# Patient Record
Sex: FEMALE | Race: WHITE | NOT HISPANIC OR LATINO | Employment: OTHER | RURAL
[De-identification: names, ages, dates, MRNs, and addresses within clinical notes are randomized per-mention and may not be internally consistent; named-entity substitution may affect disease eponyms.]

---

## 2022-01-31 ENCOUNTER — HOSPITAL ENCOUNTER (OUTPATIENT)
Dept: RADIOLOGY | Facility: HOSPITAL | Age: 62
Discharge: HOME | End: 2022-01-31
Attending: NURSE PRACTITIONER
Payer: COMMERCIAL

## 2022-01-31 DIAGNOSIS — M54.50 LOW BACK PAIN: ICD-10-CM

## 2022-01-31 DIAGNOSIS — M54.50 LOW BACK PAIN: Primary | ICD-10-CM

## 2022-01-31 PROCEDURE — 72100 X-RAY EXAM L-S SPINE 2/3 VWS: CPT | Mod: TC

## 2022-02-21 ENCOUNTER — HOSPITAL ENCOUNTER (EMERGENCY)
Facility: HOSPITAL | Age: 62
Discharge: HOME | End: 2022-02-22
Attending: FAMILY MEDICINE
Payer: COMMERCIAL

## 2022-02-21 DIAGNOSIS — R56.9 SEIZURE: ICD-10-CM

## 2022-02-21 DIAGNOSIS — K52.9 COLITIS: Primary | ICD-10-CM

## 2022-02-21 LAB
ALBUMIN SERPL BCP-MCNC: 4.3 G/DL (ref 3.5–5)
ALBUMIN/GLOB SERPL: 1.2 {RATIO}
ALP SERPL-CCNC: 118 U/L (ref 46–118)
ALT SERPL W P-5'-P-CCNC: 32 U/L (ref 13–56)
ANION GAP SERPL CALCULATED.3IONS-SCNC: 19 MMOL/L (ref 7–16)
AST SERPL W P-5'-P-CCNC: 17 U/L (ref 15–37)
BACTERIA #/AREA URNS HPF: ABNORMAL /HPF
BASOPHILS # BLD AUTO: 0.04 K/UL (ref 0–0.2)
BASOPHILS NFR BLD AUTO: 0.2 % (ref 0–1)
BILIRUB SERPL-MCNC: 0.7 MG/DL (ref 0–1.2)
BILIRUB UR QL STRIP: NEGATIVE
BUN SERPL-MCNC: 17 MG/DL (ref 7–18)
BUN/CREAT SERPL: 25 (ref 6–20)
CALCIUM SERPL-MCNC: 9.9 MG/DL (ref 8.5–10.1)
CHLORIDE SERPL-SCNC: 99 MMOL/L (ref 98–107)
CLARITY UR: CLEAR
CO2 SERPL-SCNC: 27 MMOL/L (ref 21–32)
COLOR UR: ABNORMAL
CREAT SERPL-MCNC: 0.67 MG/DL (ref 0.55–1.02)
DIFFERENTIAL METHOD BLD: ABNORMAL
EOSINOPHIL # BLD AUTO: 0 K/UL (ref 0–0.5)
EOSINOPHIL NFR BLD AUTO: 0 % (ref 1–4)
ERYTHROCYTE [DISTWIDTH] IN BLOOD BY AUTOMATED COUNT: 11.7 % (ref 11.5–14.5)
GLOBULIN SER-MCNC: 3.7 G/DL (ref 2–4)
GLUCOSE SERPL-MCNC: 167 MG/DL (ref 74–106)
GLUCOSE SERPL-MCNC: 180 MG/DL (ref 70–105)
GLUCOSE UR STRIP-MCNC: NEGATIVE MG/DL
HCT VFR BLD AUTO: 46.2 % (ref 38–47)
HGB BLD-MCNC: 16.3 G/DL (ref 12–16)
IMM GRANULOCYTES # BLD AUTO: 0.14 K/UL (ref 0–0.04)
IMM GRANULOCYTES NFR BLD: 0.6 % (ref 0–0.4)
KETONES UR STRIP-SCNC: NEGATIVE MG/DL
LEUKOCYTE ESTERASE UR QL STRIP: ABNORMAL
LIPASE SERPL-CCNC: 60 U/L (ref 73–393)
LYMPHOCYTES # BLD AUTO: 1.35 K/UL (ref 1–4.8)
LYMPHOCYTES NFR BLD AUTO: 5.8 % (ref 27–41)
LYMPHOCYTES NFR BLD MANUAL: 6 % (ref 27–41)
MCH RBC QN AUTO: 30.3 PG (ref 27–31)
MCHC RBC AUTO-ENTMCNC: 35.3 G/DL (ref 32–36)
MCV RBC AUTO: 85.9 FL (ref 80–96)
MONOCYTES # BLD AUTO: 0.29 K/UL (ref 0–0.8)
MONOCYTES NFR BLD AUTO: 1.3 % (ref 2–6)
MONOCYTES NFR BLD MANUAL: 2 % (ref 2–6)
MPC BLD CALC-MCNC: 12.9 FL (ref 9.4–12.4)
NEUTROPHILS # BLD AUTO: 21.38 K/UL (ref 1.8–7.7)
NEUTROPHILS NFR BLD AUTO: 92.1 % (ref 53–65)
NEUTS BAND NFR BLD MANUAL: 4 % (ref 1–5)
NEUTS SEG NFR BLD MANUAL: 88 % (ref 50–62)
NITRITE UR QL STRIP: NEGATIVE
NRBC # BLD AUTO: 0 X10E3/UL
NRBC, AUTO (.00): 0 %
PH UR STRIP: 8 PH UNITS
PLATELET # BLD AUTO: 239 K/UL (ref 150–400)
PLATELET MORPHOLOGY: ABNORMAL
POTASSIUM SERPL-SCNC: 3.1 MMOL/L (ref 3.5–5.1)
PROT SERPL-MCNC: 8 G/DL (ref 6.4–8.2)
PROT UR QL STRIP: 30
RBC # BLD AUTO: 5.38 M/UL (ref 4.2–5.4)
RBC # UR STRIP: ABNORMAL /UL
RBC #/AREA URNS HPF: ABNORMAL /HPF
RBC MORPH BLD: NORMAL
SODIUM SERPL-SCNC: 142 MMOL/L (ref 136–145)
SP GR UR STRIP: 1.01
SQUAMOUS #/AREA URNS LPF: ABNORMAL /LPF
UROBILINOGEN UR STRIP-ACNC: 0.2 MG/DL
WBC # BLD AUTO: 23.2 K/UL (ref 4.5–11)
WBC #/AREA URNS HPF: ABNORMAL /HPF

## 2022-02-21 PROCEDURE — 82962 GLUCOSE BLOOD TEST: CPT

## 2022-02-21 PROCEDURE — 81001 URINALYSIS AUTO W/SCOPE: CPT | Performed by: FAMILY MEDICINE

## 2022-02-21 PROCEDURE — 96367 TX/PROPH/DG ADDL SEQ IV INF: CPT

## 2022-02-21 PROCEDURE — 99284 PR EMERGENCY DEPT VISIT,LEVEL IV: ICD-10-PCS | Mod: ,,, | Performed by: FAMILY MEDICINE

## 2022-02-21 PROCEDURE — 25500020 PHARM REV CODE 255: Performed by: FAMILY MEDICINE

## 2022-02-21 PROCEDURE — 99284 EMERGENCY DEPT VISIT MOD MDM: CPT | Mod: ,,, | Performed by: FAMILY MEDICINE

## 2022-02-21 PROCEDURE — 63600175 PHARM REV CODE 636 W HCPCS: Performed by: FAMILY MEDICINE

## 2022-02-21 PROCEDURE — 96375 TX/PRO/DX INJ NEW DRUG ADDON: CPT

## 2022-02-21 PROCEDURE — 63600175 PHARM REV CODE 636 W HCPCS

## 2022-02-21 PROCEDURE — 36415 COLL VENOUS BLD VENIPUNCTURE: CPT | Performed by: FAMILY MEDICINE

## 2022-02-21 PROCEDURE — 96365 THER/PROPH/DIAG IV INF INIT: CPT

## 2022-02-21 PROCEDURE — 85025 COMPLETE CBC W/AUTO DIFF WBC: CPT | Performed by: FAMILY MEDICINE

## 2022-02-21 PROCEDURE — 83690 ASSAY OF LIPASE: CPT | Performed by: FAMILY MEDICINE

## 2022-02-21 PROCEDURE — 99285 EMERGENCY DEPT VISIT HI MDM: CPT | Mod: 25

## 2022-02-21 PROCEDURE — 96366 THER/PROPH/DIAG IV INF ADDON: CPT

## 2022-02-21 PROCEDURE — 80053 COMPREHEN METABOLIC PANEL: CPT | Performed by: FAMILY MEDICINE

## 2022-02-21 PROCEDURE — 25000003 PHARM REV CODE 250: Performed by: FAMILY MEDICINE

## 2022-02-21 RX ORDER — GABAPENTIN 400 MG/1
CAPSULE ORAL
COMMUNITY

## 2022-02-21 RX ORDER — TRAMADOL HYDROCHLORIDE 50 MG/1
50 TABLET ORAL 3 TIMES DAILY PRN
COMMUNITY
Start: 2022-01-31 | End: 2022-10-12

## 2022-02-21 RX ORDER — BUDESONIDE, GLYCOPYRROLATE, AND FORMOTEROL FUMARATE 160; 9; 4.8 UG/1; UG/1; UG/1
2 AEROSOL, METERED RESPIRATORY (INHALATION) 2 TIMES DAILY
COMMUNITY
Start: 2022-01-26

## 2022-02-21 RX ORDER — KETOROLAC TROMETHAMINE 10 MG/1
10 TABLET, FILM COATED ORAL 2 TIMES DAILY PRN
COMMUNITY
Start: 2022-01-31 | End: 2022-10-12

## 2022-02-21 RX ORDER — ESCITALOPRAM OXALATE 10 MG/1
TABLET ORAL
COMMUNITY
Start: 2022-02-18 | End: 2022-10-12

## 2022-02-21 RX ORDER — VALSARTAN AND HYDROCHLOROTHIAZIDE 320; 25 MG/1; MG/1
1 TABLET, FILM COATED ORAL DAILY
COMMUNITY
Start: 2022-01-26

## 2022-02-21 RX ORDER — ALPRAZOLAM 0.5 MG/1
0.5 TABLET ORAL 3 TIMES DAILY
COMMUNITY
Start: 2022-01-31

## 2022-02-21 RX ORDER — CLONIDINE HYDROCHLORIDE 0.1 MG/1
0.1 TABLET ORAL 3 TIMES DAILY PRN
COMMUNITY
Start: 2021-12-27

## 2022-02-21 RX ORDER — METHOCARBAMOL 500 MG/1
500 TABLET, FILM COATED ORAL 2 TIMES DAILY PRN
COMMUNITY
Start: 2022-01-31 | End: 2022-05-28 | Stop reason: SDUPTHER

## 2022-02-21 RX ORDER — BACLOFEN 10 MG/1
10 TABLET ORAL 2 TIMES DAILY PRN
COMMUNITY
Start: 2022-01-04 | End: 2022-10-12

## 2022-02-21 RX ORDER — ONDANSETRON 2 MG/ML
4 INJECTION INTRAMUSCULAR; INTRAVENOUS
Status: COMPLETED | OUTPATIENT
Start: 2022-02-21 | End: 2022-02-21

## 2022-02-21 RX ORDER — LORAZEPAM 2 MG/ML
INJECTION INTRAMUSCULAR
Status: COMPLETED
Start: 2022-02-21 | End: 2022-02-21

## 2022-02-21 RX ORDER — TIZANIDINE 4 MG/1
TABLET ORAL
COMMUNITY
Start: 2022-02-18

## 2022-02-21 RX ORDER — LORAZEPAM 2 MG/ML
2 INJECTION INTRAMUSCULAR
Status: COMPLETED | OUTPATIENT
Start: 2022-02-21 | End: 2022-02-21

## 2022-02-21 RX ORDER — CLONIDINE HYDROCHLORIDE 0.1 MG/1
0.2 TABLET ORAL
Status: COMPLETED | OUTPATIENT
Start: 2022-02-21 | End: 2022-02-21

## 2022-02-21 RX ORDER — MELOXICAM 15 MG/1
TABLET ORAL
COMMUNITY
Start: 2022-01-26

## 2022-02-21 RX ORDER — AMLODIPINE BESYLATE 10 MG/1
10 TABLET ORAL DAILY
COMMUNITY
Start: 2022-01-26 | End: 2022-10-12

## 2022-02-21 RX ADMIN — LORAZEPAM 2 MG: 2 INJECTION INTRAMUSCULAR at 11:02

## 2022-02-21 RX ADMIN — CLONIDINE HYDROCHLORIDE 0.2 MG: 0.1 TABLET ORAL at 10:02

## 2022-02-21 RX ADMIN — PROMETHAZINE HYDROCHLORIDE 25 MG: 25 INJECTION INTRAMUSCULAR; INTRAVENOUS at 09:02

## 2022-02-21 RX ADMIN — LORAZEPAM 2 MG: 2 INJECTION INTRAMUSCULAR; INTRAVENOUS at 11:02

## 2022-02-21 RX ADMIN — IOPAMIDOL 100 ML: 755 INJECTION, SOLUTION INTRAVENOUS at 11:02

## 2022-02-21 RX ADMIN — ONDANSETRON 4 MG: 2 INJECTION INTRAMUSCULAR; INTRAVENOUS at 09:02

## 2022-02-21 RX ADMIN — SODIUM CHLORIDE 1000 ML: 9 INJECTION, SOLUTION INTRAVENOUS at 09:02

## 2022-02-22 VITALS
RESPIRATION RATE: 15 BRPM | HEIGHT: 66 IN | DIASTOLIC BLOOD PRESSURE: 86 MMHG | WEIGHT: 185 LBS | HEART RATE: 90 BPM | BODY MASS INDEX: 29.73 KG/M2 | TEMPERATURE: 99 F | OXYGEN SATURATION: 98 % | SYSTOLIC BLOOD PRESSURE: 166 MMHG

## 2022-02-22 LAB
BASOPHILS # BLD AUTO: 0.03 K/UL (ref 0–0.2)
BASOPHILS NFR BLD AUTO: 0.1 % (ref 0–1)
DIFFERENTIAL METHOD BLD: ABNORMAL
EOSINOPHIL # BLD AUTO: 0 K/UL (ref 0–0.5)
EOSINOPHIL NFR BLD AUTO: 0 % (ref 1–4)
ERYTHROCYTE [DISTWIDTH] IN BLOOD BY AUTOMATED COUNT: 11.5 % (ref 11.5–14.5)
HCT VFR BLD AUTO: 43.6 % (ref 38–47)
HGB BLD-MCNC: 15.4 G/DL (ref 12–16)
IMM GRANULOCYTES # BLD AUTO: 0.1 K/UL (ref 0–0.04)
IMM GRANULOCYTES NFR BLD: 0.5 % (ref 0–0.4)
LYMPHOCYTES # BLD AUTO: 1.67 K/UL (ref 1–4.8)
LYMPHOCYTES NFR BLD AUTO: 7.5 % (ref 27–41)
MCH RBC QN AUTO: 30.2 PG (ref 27–31)
MCHC RBC AUTO-ENTMCNC: 35.3 G/DL (ref 32–36)
MCV RBC AUTO: 85.5 FL (ref 80–96)
MONOCYTES # BLD AUTO: 1.04 K/UL (ref 0–0.8)
MONOCYTES NFR BLD AUTO: 4.7 % (ref 2–6)
MPC BLD CALC-MCNC: 11.5 FL (ref 9.4–12.4)
NEUTROPHILS # BLD AUTO: 19.33 K/UL (ref 1.8–7.7)
NEUTROPHILS NFR BLD AUTO: 87.2 % (ref 53–65)
NRBC # BLD AUTO: 0 X10E3/UL
NRBC, AUTO (.00): 0 %
PLATELET # BLD AUTO: 217 K/UL (ref 150–400)
RBC # BLD AUTO: 5.1 M/UL (ref 4.2–5.4)
WBC # BLD AUTO: 22.17 K/UL (ref 4.5–11)

## 2022-02-22 PROCEDURE — 25000003 PHARM REV CODE 250: Performed by: FAMILY MEDICINE

## 2022-02-22 PROCEDURE — 36415 COLL VENOUS BLD VENIPUNCTURE: CPT | Performed by: FAMILY MEDICINE

## 2022-02-22 PROCEDURE — S0030 INJECTION, METRONIDAZOLE: HCPCS | Performed by: FAMILY MEDICINE

## 2022-02-22 PROCEDURE — 63600175 PHARM REV CODE 636 W HCPCS

## 2022-02-22 PROCEDURE — 85025 COMPLETE CBC W/AUTO DIFF WBC: CPT | Performed by: FAMILY MEDICINE

## 2022-02-22 PROCEDURE — 63600175 PHARM REV CODE 636 W HCPCS: Performed by: FAMILY MEDICINE

## 2022-02-22 RX ORDER — ONDANSETRON 4 MG/1
8 TABLET, ORALLY DISINTEGRATING ORAL EVERY 6 HOURS PRN
Qty: 20 TABLET | Refills: 0 | Status: SHIPPED | OUTPATIENT
Start: 2022-02-22 | End: 2022-10-12

## 2022-02-22 RX ORDER — CIPROFLOXACIN 2 MG/ML
INJECTION, SOLUTION INTRAVENOUS
Status: COMPLETED
Start: 2022-02-22 | End: 2022-02-22

## 2022-02-22 RX ORDER — AMOXICILLIN AND CLAVULANATE POTASSIUM 875; 125 MG/1; MG/1
1 TABLET, FILM COATED ORAL 2 TIMES DAILY
Qty: 14 TABLET | Refills: 0 | Status: SHIPPED | OUTPATIENT
Start: 2022-02-22 | End: 2022-10-12

## 2022-02-22 RX ORDER — METRONIDAZOLE 500 MG/100ML
500 INJECTION, SOLUTION INTRAVENOUS
Status: DISCONTINUED | OUTPATIENT
Start: 2022-02-22 | End: 2022-02-22 | Stop reason: HOSPADM

## 2022-02-22 RX ORDER — CIPROFLOXACIN 2 MG/ML
400 INJECTION, SOLUTION INTRAVENOUS
Status: COMPLETED | OUTPATIENT
Start: 2022-02-22 | End: 2022-02-22

## 2022-02-22 RX ORDER — HYDRALAZINE HYDROCHLORIDE 20 MG/ML
20 INJECTION INTRAMUSCULAR; INTRAVENOUS
Status: COMPLETED | OUTPATIENT
Start: 2022-02-22 | End: 2022-02-22

## 2022-02-22 RX ADMIN — SODIUM CHLORIDE 1000 ML: 9 INJECTION, SOLUTION INTRAVENOUS at 03:02

## 2022-02-22 RX ADMIN — METRONIDAZOLE 500 MG: 500 INJECTION, SOLUTION INTRAVENOUS at 03:02

## 2022-02-22 RX ADMIN — CIPROFLOXACIN 400 MG: 2 INJECTION, SOLUTION INTRAVENOUS at 02:02

## 2022-02-22 RX ADMIN — HYDRALAZINE HYDROCHLORIDE 20 MG: 20 INJECTION INTRAMUSCULAR; INTRAVENOUS at 04:02

## 2022-02-22 NOTE — ED PROVIDER NOTES
Encounter Date: 2/21/2022       History     Chief Complaint   Patient presents with    Abdominal Pain    Back Pain     Patient is a 61-year-old  female who presents to the ED via EMS with complaints of vomiting abdominal pain.  Patient states she ate a peanut butter and jelly sandwich about 12:00 p.m. today, and seen after he began vomiting.  Since then, she has not been able to keep anything down, with the exception of a few sips of water.  She states the pain began after vomiting, and is primarily located in her lower abdomen.  Patient has a history of chronic low back pain secondary to surgery, but states her pain has been worse than usual since yesterday.  She reports chills overnight and a severe headache all day.  Patient denies fever, cough, chest pain, diarrhea constipation, urinary symptoms.        Review of patient's allergies indicates:   Allergen Reactions    Varenicline Other (See Comments)     History reviewed. No pertinent past medical history.  History reviewed. No pertinent surgical history.  History reviewed. No pertinent family history.  Social History     Tobacco Use    Smoking status: Never Smoker    Smokeless tobacco: Never Used   Substance Use Topics    Alcohol use: Not Currently    Drug use: Not Currently     Review of Systems   Constitutional: Positive for chills. Negative for fever.   Gastrointestinal: Positive for abdominal pain, nausea and vomiting. Negative for blood in stool, constipation and diarrhea.   Musculoskeletal: Positive for back pain.   Neurological: Positive for weakness and headaches. Negative for dizziness, syncope and light-headedness.   All other systems reviewed and are negative.      Physical Exam     Initial Vitals [02/21/22 2110]   BP Pulse Resp Temp SpO2   (!) 219/108 108 18 99 °F (37.2 °C) 99 %      MAP       --         Physical Exam    Constitutional: She appears well-developed and well-nourished.   Patient is writhing in pain in the bed on exam.    HENT:   Head: Normocephalic and atraumatic.   Eyes: Conjunctivae are normal. Pupils are equal, round, and reactive to light.   Cardiovascular: Normal rate, regular rhythm and normal heart sounds.   Pulmonary/Chest: Breath sounds normal. No respiratory distress.   Abdominal: Abdomen is soft. Bowel sounds are normal. She exhibits no distension. There is abdominal tenderness (epigastric). There is negative Wright's sign.     Neurological: She is alert and oriented to person, place, and time.   Skin: Skin is warm and dry. No pallor.         Medical Screening Exam   See Full Note    ED Course   Procedures  Labs Reviewed   COMPREHENSIVE METABOLIC PANEL - Abnormal; Notable for the following components:       Result Value    Potassium 3.1 (*)     Anion Gap 19 (*)     Glucose 167 (*)     BUN/Creatinine Ratio 25 (*)     All other components within normal limits   URINALYSIS, REFLEX TO URINE CULTURE - Abnormal; Notable for the following components:    Leukocytes, UA Small (*)     Protein, UA 30  (*)     Blood, UA Moderate (*)     All other components within normal limits   LIPASE - Abnormal; Notable for the following components:    Lipase 60 (*)     All other components within normal limits   CBC WITH DIFFERENTIAL - Abnormal; Notable for the following components:    WBC 23.20 (*)     Hemoglobin 16.3 (*)     MPV 12.9 (*)     Neutrophils % 92.1 (*)     Lymphocytes % 5.8 (*)     Monocytes % 1.3 (*)     Eosinophils % 0.0 (*)     Immature Granulocytes % 0.6 (*)     Neutrophils, Abs 21.38 (*)     Immature Granulocytes, Absolute 0.14 (*)     All other components within normal limits   MANUAL DIFFERENTIAL - Abnormal; Notable for the following components:    Segmented Neutrophils, Man % 88 (*)     Lymphocytes, Man % 6 (*)     Platelet Morphology Few Large Platelets (*)     All other components within normal limits   URINALYSIS, MICROSCOPIC - Abnormal; Notable for the following components:    Bacteria, UA Occasional (*)     All other  components within normal limits   CBC WITH DIFFERENTIAL - Abnormal; Notable for the following components:    WBC 22.17 (*)     Neutrophils % 87.2 (*)     Lymphocytes % 7.5 (*)     Eosinophils % 0.0 (*)     Immature Granulocytes % 0.5 (*)     Neutrophils, Abs 19.33 (*)     Monocytes, Absolute 1.04 (*)     Immature Granulocytes, Absolute 0.10 (*)     All other components within normal limits   POCT GLUCOSE MONITORING CONTINUOUS - Abnormal; Notable for the following components:    POC Glucose 180 (*)     All other components within normal limits   CBC W/ AUTO DIFFERENTIAL    Narrative:     The following orders were created for panel order CBC auto differential.  Procedure                               Abnormality         Status                     ---------                               -----------         ------                     CBC with Differential[[id number]]        Abnormal            Final result               Manual Differential[[id number]]          Abnormal            Final result                 Please view results for these tests on the individual orders.   CBC W/ AUTO DIFFERENTIAL    Narrative:     The following orders were created for panel order CBC auto differential.  Procedure                               Abnormality         Status                     ---------                               -----------         ------                     CBC with Differential[[id number]]        Abnormal            Final result                 Please view results for these tests on the individual orders.   EXTRA TUBES    Narrative:     The following orders were created for panel order EXTRA TUBES.  Procedure                               Abnormality         Status                     ---------                               -----------         ------                     Light Blue Top Hold[[id number]]                              In process                 Lavender Top Hold[[id number]]                                In  process                 Gold Top Hold[[id number]]                                    In process                   Please view results for these tests on the individual orders.   LIGHT BLUE TOP HOLD   LAVENDER TOP HOLD   GOLD TOP HOLD          Imaging Results          CT Head Without Contrast (In process)                CT Abdomen Pelvis With Contrast (In process)                  Medications   metronidazole IVPB 500 mg (0 mg Intravenous Stopped 2/22/22 0454)   sodium chloride 0.9% bolus 1,000 mL (0 mLs Intravenous Stopped 2/21/22 2301)   ondansetron injection 4 mg (4 mg Intravenous Given 2/21/22 2128)   cloNIDine tablet 0.2 mg (0.2 mg Oral Given 2/21/22 2211)   promethazine (PHENERGAN) 25 mg in dextrose 5 % 50 mL IVPB (0 mg Intravenous Stopped 2/21/22 2301)   LORazepam injection 2 mg (2 mg Intravenous Given 2/21/22 2300)   iopamidoL (ISOVUE-370) injection 100 mL (100 mLs Intravenous Given 2/21/22 2323)   ciprofloxacin (CIPRO)400mg/200ml D5W IVPB 400 mg (0 mg Intravenous Stopped 2/22/22 0331)   sodium chloride 0.9% bolus 1,000 mL (0 mLs Intravenous Stopped 2/22/22 0454)   hydrALAZINE injection 20 mg (20 mg Intravenous Given 2/22/22 0454)     Medical Decision Making:   ED Management:  22:55: Pt had a 2 min witnessed, tonic, clonic seizure. After she finished seizing, she was able to speak clearly. Ativan given. Head CT ordered. She has not had her Xanax for 2 days. I feel this is the reason for her seizure.     01:10: Pt states she is feeling better. She remains alert and is answering all questions appropriately. Nausea is improved. Pt's CT shows mild colitis.                   Clinical Impression:   Final diagnoses:  [K52.9] Colitis (Primary)  [R56.9] Seizure          ED Disposition Condition    Discharge Stable        ED Prescriptions     Medication Sig Dispense Start Date End Date Auth. Provider    amoxicillin-clavulanate 875-125mg (AUGMENTIN) 875-125 mg per tablet Take 1 tablet by mouth 2 (two) times daily. 14  tablet 2/22/2022  Katherine Hickey MD    ondansetron (ZOFRAN-ODT) 4 MG TbDL Take 2 tablets (8 mg total) by mouth every 6 (six) hours as needed (nausea). 20 tablet 2/22/2022  Katherine Hickey MD        Follow-up Information    None          Katherine Hickey MD  02/22/22 0545

## 2022-02-22 NOTE — ED NOTES
Staff emergency button pulled by CT tech. Upon arrival to room, pt is currently having a tonic clonic seizure with HR of 166. Pt placed on monitor, oxygen applied to pt, and pt rolled on side. Seizure lasted approx 2 minutes.

## 2022-05-28 ENCOUNTER — HOSPITAL ENCOUNTER (EMERGENCY)
Facility: HOSPITAL | Age: 62
Discharge: HOME | End: 2022-05-28
Payer: COMMERCIAL

## 2022-05-28 VITALS
OXYGEN SATURATION: 93 % | HEIGHT: 66 IN | TEMPERATURE: 98 F | RESPIRATION RATE: 20 BRPM | HEART RATE: 77 BPM | SYSTOLIC BLOOD PRESSURE: 175 MMHG | WEIGHT: 175 LBS | BODY MASS INDEX: 28.12 KG/M2 | DIASTOLIC BLOOD PRESSURE: 89 MMHG
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
## 2022-05-28 DIAGNOSIS — S22.000A THORACIC COMPRESSION FRACTURE, CLOSED, INITIAL ENCOUNTER: ICD-10-CM

## 2022-05-28 DIAGNOSIS — S62.524A CLOSED NONDISPLACED FRACTURE OF DISTAL PHALANX OF RIGHT THUMB, INITIAL ENCOUNTER: ICD-10-CM

## 2022-05-28 DIAGNOSIS — S22.42XA CLOSED FRACTURE OF MULTIPLE RIBS OF LEFT SIDE, INITIAL ENCOUNTER: ICD-10-CM

## 2022-05-28 PROCEDURE — 29130 APPL FINGER SPLINT STATIC: CPT

## 2022-05-28 PROCEDURE — 25000003 PHARM REV CODE 250: Performed by: NURSE PRACTITIONER

## 2022-05-28 PROCEDURE — 99283 PR EMERGENCY DEPT VISIT,LEVEL III: ICD-10-PCS | Mod: ,,, | Performed by: NURSE PRACTITIONER

## 2022-05-28 PROCEDURE — 96372 THER/PROPH/DIAG INJ SC/IM: CPT

## 2022-05-28 PROCEDURE — 99284 EMERGENCY DEPT VISIT MOD MDM: CPT

## 2022-05-28 PROCEDURE — 63600175 PHARM REV CODE 636 W HCPCS: Performed by: NURSE PRACTITIONER

## 2022-05-28 PROCEDURE — 99283 EMERGENCY DEPT VISIT LOW MDM: CPT | Mod: ,,, | Performed by: NURSE PRACTITIONER

## 2022-05-28 RX ORDER — ORPHENADRINE CITRATE 30 MG/ML
60 INJECTION INTRAMUSCULAR; INTRAVENOUS
Status: COMPLETED | OUTPATIENT
Start: 2022-05-28 | End: 2022-05-28

## 2022-05-28 RX ORDER — METHOCARBAMOL 750 MG/1
750 TABLET, FILM COATED ORAL 3 TIMES DAILY PRN
Qty: 30 TABLET | Refills: 0 | Status: SHIPPED | OUTPATIENT
Start: 2022-05-28 | End: 2022-06-07

## 2022-05-28 RX ORDER — HYDROCODONE BITARTRATE AND ACETAMINOPHEN 5; 325 MG/1; MG/1
1 TABLET ORAL
Status: COMPLETED | OUTPATIENT
Start: 2022-05-28 | End: 2022-05-28

## 2022-05-28 RX ADMIN — ORPHENADRINE CITRATE 60 MG: 30 INJECTION INTRAMUSCULAR; INTRAVENOUS at 02:05

## 2022-05-28 RX ADMIN — HYDROCODONE BITARTRATE AND ACETAMINOPHEN 1 TABLET: 5; 325 TABLET ORAL at 02:05

## 2022-06-03 PROBLEM — S62.524A CLOSED NONDISPLACED FRACTURE OF DISTAL PHALANX OF RIGHT THUMB: Status: ACTIVE | Noted: 2022-06-03

## 2022-06-09 DIAGNOSIS — S62.524A CLOSED NONDISPLACED FRACTURE OF DISTAL PHALANX OF RIGHT THUMB, INITIAL ENCOUNTER: Primary | ICD-10-CM

## 2022-06-13 ENCOUNTER — HOSPITAL ENCOUNTER (OUTPATIENT)
Dept: RADIOLOGY | Facility: HOSPITAL | Age: 62
Discharge: HOME | End: 2022-06-13
Attending: ORTHOPAEDIC SURGERY
Payer: MEDICARE

## 2022-06-13 ENCOUNTER — OFFICE VISIT (OUTPATIENT)
Dept: SPINE | Facility: CLINIC | Age: 62
End: 2022-06-13
Payer: COMMERCIAL

## 2022-06-13 ENCOUNTER — HOSPITAL ENCOUNTER (OUTPATIENT)
Dept: RADIOLOGY | Facility: HOSPITAL | Age: 62
Discharge: HOME | End: 2022-06-13
Attending: ORTHOPAEDIC SURGERY
Payer: COMMERCIAL

## 2022-06-13 DIAGNOSIS — S62.524A CLOSED NONDISPLACED FRACTURE OF DISTAL PHALANX OF RIGHT THUMB, INITIAL ENCOUNTER: ICD-10-CM

## 2022-06-13 DIAGNOSIS — S22.000A THORACIC COMPRESSION FRACTURE, CLOSED, INITIAL ENCOUNTER: ICD-10-CM

## 2022-06-13 PROCEDURE — 72082 X-RAY EXAM ENTIRE SPI 2/3 VW: CPT | Mod: 26,,, | Performed by: ORTHOPAEDIC SURGERY

## 2022-06-13 PROCEDURE — 99204 OFFICE O/P NEW MOD 45 MIN: CPT | Mod: S$PBB,,, | Performed by: ORTHOPAEDIC SURGERY

## 2022-06-13 PROCEDURE — 72082 XR SCOLIOSIS COMPLETE: ICD-10-PCS | Mod: 26,,, | Performed by: ORTHOPAEDIC SURGERY

## 2022-06-13 PROCEDURE — 72082 X-RAY EXAM ENTIRE SPI 2/3 VW: CPT | Mod: TC

## 2022-06-13 PROCEDURE — 99212 OFFICE O/P EST SF 10 MIN: CPT | Mod: PBBFAC | Performed by: ORTHOPAEDIC SURGERY

## 2022-06-13 PROCEDURE — 73140 X-RAY EXAM OF FINGER(S): CPT | Mod: TC,RT

## 2022-06-13 PROCEDURE — 99204 PR OFFICE/OUTPT VISIT, NEW, LEVL IV, 45-59 MIN: ICD-10-PCS | Mod: S$PBB,,, | Performed by: ORTHOPAEDIC SURGERY

## 2022-06-13 NOTE — PROGRESS NOTES
MDM/time:  Greater than 45 minutes spent on this encounter including 15 minutes reviewing imaging and notes, 20 minutes with the patient, 10 minutes documentation    ASSESSMENT:  62 y.o. female with thoracolumbar scoliosis and lumbar spondylosis, prior L3-5 laminectomy fusion    PLAN:  Physical therapy lumbar spine -patient would like to try home health physical therapy  Nicotine dependence discussed smoking cessation  Follow-up in 3 months    HPI:  62 y.o. female here for evaluation of left-sided lower back pain nonradiating.  Patient reports she fell 4 ft off of a porch flat onto her back 2022 the upper left rib 4th and 5th fracture.  Patient reports decreased  strength in bilateral hands.  Reports issues with balance with multiple falls.  Denies bladder bowel incontinence.  Difficulty walking more than 5-10 minutes due to back pain.  Currently taking Tylenol as needed for pain and Mobic 15 mg daily, gabapentin 400 mg 1 tablet twice a day.  No recent physical therapy.  No prior pain management.  No recent MRI.  Patient reports she has had 3 prior spine surgeries, 1 surgery was a laser spine surgery in [facility],second surgery was with with Dr. Pedro Mccrary which left her with a right footdrop she does wear an AFO brace. She then had her last surgery with Dr. Marr at [facility] 2019. Patient currently smokes 1 pack of cigarettes per day and has done this for the past 30 years.    IMAGIN2022 scoliosis series reviewed showed:  On the AP there is right thoracolumbar curvature.  There has been prior L3-L5 laminectomy and fusion.  There are 5 non-rib-bearing lumbar vertebra.  On the lateral view there is maintained thoracic kyphosis and decreased lumbar lordosis.  No fractures or listhesis noted.  There is lumbar spondylosis noted.      No past medical history on file.  No past surgical history on file.  Social History     Tobacco Use    Smoking status: Never Smoker    Smokeless tobacco: Never  Used   Substance Use Topics    Alcohol use: Not Currently    Drug use: Not Currently      Current Outpatient Medications   Medication Instructions    ALPRAZolam (XANAX) 0.5 mg, Oral, 3 times daily    amLODIPine (NORVASC) 10 mg, Oral, Daily    amoxicillin-clavulanate 875-125mg (AUGMENTIN) 875-125 mg per tablet 1 tablet, Oral, 2 times daily    baclofen (LIORESAL) 10 mg, Oral, 2 times daily PRN    BREZTRI AEROSPHERE 160-9-4.8 mcg/actuation HFAA 2 puffs, Oral, 2 times daily    cloNIDine (CATAPRES) 0.1 mg, Oral, 3 times daily PRN    EScitalopram oxalate (LEXAPRO) 10 MG tablet No dose, route, or frequency recorded.    gabapentin (NEURONTIN) 400 MG capsule 1 capsule    ketorolac (TORADOL) 10 mg, Oral, 2 times daily PRN    meloxicam (MOBIC) 15 MG tablet TAKE ONE TABLET BY MOUTH DAILY FOR JOINT PAIN    ondansetron (ZOFRAN-ODT) 8 mg, Oral, Every 6 hours PRN    oxyCODONE-acetaminophen (PERCOCET)  mg per tablet 1 tablet, Oral, Every 4 hours PRN    tiZANidine (ZANAFLEX) 4 MG tablet Oral    traMADoL (ULTRAM) 50 mg, Oral, 3 times daily PRN    valsartan-hydrochlorothiazide (DIOVAN-HCT) 320-25 mg per tablet 1 tablet, Oral, Daily        EXAM:  Constitutional  General Appearance:  There is no height or weight on file to calculate BMI., NAD  Psychiatric   Orientation: Oriented to time, oriented to place, oriented to person  Mood and Affect: Active and alert, normal mood, normal affect  Gait and Station   Appearance:  Antalgic gait to the right, unable to tandem gait, unable to walk on toes, unable to walk on heels    LUMBAR  Musculoskeletal System   Hips: Normal appearance, no leg length discrepancy, normal motion; left, normal motion; right    Lumbar Spine                   Inspection:  Normal alignment, normal sagittal balance                  Range of motion:  Decreased flexion, extension, lateral bending, rotation. Pain with range of motion                  Bony Palpation of the Lumbar Spine:  No tenderness  of the spinous process, no tenderness of the sacrum, no tenderness of the coccyx                  Bony Palpation of the Right Hip:  No tenderness of the iliac crest, no tenderness of the sciatic notch, no tenderness of the SI joint                  Bony Palpation of the Left Hip:  No tenderness of the iliac crest, no tenderness of the sciatic notch, no tenderness of the SI joint                  Soft Tissue Palpation on the Right:  No tenderness of the paraspinal region, no tenderness of the iliolumbar region                  Soft Tissue Palpation on the Left:  No tenderness of the paraspinal region, no tenderness of the iliolumbar region    Motor Strength   L1 Right:  Hip flexion iliopsoas 4/5    L1 Left:  Hip flexion iliopsoas 5/5              L2-L4 Right:  Knee extension quadriceps 4/5, tibialis anterior4/5              L2-L4 Left:  Knee extension quadriceps 5/5, tibialis anterior 5/5   L5 Right:  Extensor hallucis llongus 4/5,    L5 Left:  Extensor hallucis longus 3/5,    S1 Right:  Plantar flexion gastrocnemius 3/5   S1 Left:  Plantar flexion gastrocnemius 5/5    Neurological System   Ankle Reflex Right:  normal   Ankle Reflex Left: normal   Knee Reflex Right:  normal   Knee Reflex Left:  normal   Sensation on the Right:  L2 normal, L3 normal, L4 normal, L5 normal, S1 normal   Sensation on the Left:  L2 normal, L3 normal, L4 normal, L5 normal, S1 normal              Special Test on the Right:  Seated straight leg raising test negative, no clonus of the ankle              Special Test on the Left:  Seated straight leg raising test negative, no clonus of the ankle    Skin   Lumbosacral Spine:  Normal skin    Cardiovascular System   Arterial Pulses Right:  Posterior tibialis normal, dorsalis pedis normal   Arterial Pulses Left:  Posterior tibialis normal, dorsalis pedis normal   Edema Right: None   Edema Left:  None

## 2022-06-13 NOTE — PROGRESS NOTES
AP and lateral views scoliosis series reviewed    On the AP there is right thoracolumbar curvature.  There has been prior L3-L5 laminectomy and fusion.  There are 5 non-rib-bearing lumbar vertebra.  On the lateral view there is maintained thoracic kyphosis and decreased lumbar lordosis.  No fractures or listhesis noted.  There is lumbar spondylosis noted.    Impression:  Right thoracolumbar curvature.  Lumbar spondylosis.  Prior L3-L5 laminectomy and fusion.

## 2022-07-20 ENCOUNTER — HOSPITAL ENCOUNTER (OUTPATIENT)
Dept: RADIOLOGY | Facility: HOSPITAL | Age: 62
Discharge: HOME | End: 2022-07-20
Attending: ORTHOPAEDIC SURGERY
Payer: COMMERCIAL

## 2022-07-20 DIAGNOSIS — S62.524A CLOSED NONDISPLACED FRACTURE OF DISTAL PHALANX OF RIGHT THUMB, INITIAL ENCOUNTER: ICD-10-CM

## 2022-07-20 PROCEDURE — 73140 X-RAY EXAM OF FINGER(S): CPT | Mod: TC,RT

## 2022-10-12 ENCOUNTER — OFFICE VISIT (OUTPATIENT)
Dept: OBSTETRICS AND GYNECOLOGY | Facility: CLINIC | Age: 62
End: 2022-10-12
Payer: COMMERCIAL

## 2022-10-12 VITALS
HEART RATE: 71 BPM | SYSTOLIC BLOOD PRESSURE: 146 MMHG | WEIGHT: 141.81 LBS | HEIGHT: 66 IN | RESPIRATION RATE: 16 BRPM | BODY MASS INDEX: 22.79 KG/M2 | TEMPERATURE: 97 F | DIASTOLIC BLOOD PRESSURE: 88 MMHG

## 2022-10-12 DIAGNOSIS — N95.2 VAGINITIS, ATROPHIC: ICD-10-CM

## 2022-10-12 DIAGNOSIS — N39.3 URINARY, INCONTINENCE, STRESS FEMALE: Primary | ICD-10-CM

## 2022-10-12 DIAGNOSIS — N39.46 URINARY INCONTINENCE, MIXED: ICD-10-CM

## 2022-10-12 DIAGNOSIS — Z01.419 ENCOUNTER FOR ANNUAL ROUTINE GYNECOLOGICAL EXAMINATION: ICD-10-CM

## 2022-10-12 DIAGNOSIS — E55.9 VITAMIN D DEFICIENCY: ICD-10-CM

## 2022-10-12 LAB
25(OH)D3 SERPL-MCNC: 93.8 NG/ML
ALBUMIN SERPL BCP-MCNC: 3.8 G/DL (ref 3.5–5)
ALBUMIN/GLOB SERPL: 1.2 {RATIO}
ALP SERPL-CCNC: 100 U/L (ref 50–130)
ALT SERPL W P-5'-P-CCNC: 31 U/L (ref 13–56)
ANION GAP SERPL CALCULATED.3IONS-SCNC: 7 MMOL/L (ref 7–16)
AST SERPL W P-5'-P-CCNC: 22 U/L (ref 15–37)
BASOPHILS # BLD AUTO: 0.04 K/UL (ref 0–0.2)
BASOPHILS NFR BLD AUTO: 0.5 % (ref 0–1)
BILIRUB SERPL-MCNC: 0.6 MG/DL (ref ?–1.2)
BILIRUB UR QL STRIP: NEGATIVE
BUN SERPL-MCNC: 16 MG/DL (ref 7–18)
BUN/CREAT SERPL: 24 (ref 6–20)
CALCIUM SERPL-MCNC: 9 MG/DL (ref 8.5–10.1)
CHLORIDE SERPL-SCNC: 105 MMOL/L (ref 98–107)
CHOLEST SERPL-MCNC: 154 MG/DL (ref 0–200)
CHOLEST/HDLC SERPL: 3.4 {RATIO}
CLARITY UR: CLEAR
CO2 SERPL-SCNC: 32 MMOL/L (ref 21–32)
COLOR UR: YELLOW
CREAT SERPL-MCNC: 0.68 MG/DL (ref 0.55–1.02)
DIFFERENTIAL METHOD BLD: ABNORMAL
EGFR (NO RACE VARIABLE) (RUSH/TITUS): 100 ML/MIN/1.73M²
EOSINOPHIL # BLD AUTO: 0.1 K/UL (ref 0–0.5)
EOSINOPHIL NFR BLD AUTO: 1.2 % (ref 1–4)
ERYTHROCYTE [DISTWIDTH] IN BLOOD BY AUTOMATED COUNT: 11.9 % (ref 11.5–14.5)
GLOBULIN SER-MCNC: 3.2 G/DL (ref 2–4)
GLUCOSE SERPL-MCNC: 98 MG/DL (ref 74–106)
GLUCOSE UR STRIP-MCNC: NORMAL MG/DL
HCT VFR BLD AUTO: 43.4 % (ref 38–47)
HDLC SERPL-MCNC: 45 MG/DL (ref 40–60)
HGB BLD-MCNC: 15 G/DL (ref 12–16)
IMM GRANULOCYTES # BLD AUTO: 0.01 K/UL (ref 0–0.04)
IMM GRANULOCYTES NFR BLD: 0.1 % (ref 0–0.4)
KETONES UR STRIP-SCNC: NEGATIVE MG/DL
LDLC SERPL CALC-MCNC: 73 MG/DL
LDLC/HDLC SERPL: 1.6 {RATIO}
LEUKOCYTE ESTERASE UR QL STRIP: NEGATIVE
LYMPHOCYTES # BLD AUTO: 2.51 K/UL (ref 1–4.8)
LYMPHOCYTES NFR BLD AUTO: 29.5 % (ref 27–41)
MCH RBC QN AUTO: 31.5 PG (ref 27–31)
MCHC RBC AUTO-ENTMCNC: 34.6 G/DL (ref 32–36)
MCV RBC AUTO: 91.2 FL (ref 80–96)
MONOCYTES # BLD AUTO: 0.5 K/UL (ref 0–0.8)
MONOCYTES NFR BLD AUTO: 5.9 % (ref 2–6)
MPC BLD CALC-MCNC: 13.1 FL (ref 9.4–12.4)
NEUTROPHILS # BLD AUTO: 5.34 K/UL (ref 1.8–7.7)
NEUTROPHILS NFR BLD AUTO: 62.8 % (ref 53–65)
NITRITE UR QL STRIP: NEGATIVE
NONHDLC SERPL-MCNC: 109 MG/DL
NRBC # BLD AUTO: 0 X10E3/UL
NRBC, AUTO (.00): 0 %
PH UR STRIP: 7 PH UNITS
PLATELET # BLD AUTO: 179 K/UL (ref 150–400)
PLATELET MORPHOLOGY: ABNORMAL
POTASSIUM SERPL-SCNC: 3.9 MMOL/L (ref 3.5–5.1)
PROT SERPL-MCNC: 7 G/DL (ref 6.4–8.2)
PROT UR QL STRIP: NEGATIVE
RBC # BLD AUTO: 4.76 M/UL (ref 4.2–5.4)
RBC # UR STRIP: NEGATIVE /UL
RBC MORPH BLD: NORMAL
SODIUM SERPL-SCNC: 140 MMOL/L (ref 136–145)
SP GR UR STRIP: 1.02
TRIGL SERPL-MCNC: 181 MG/DL (ref 35–150)
UROBILINOGEN UR STRIP-ACNC: NORMAL MG/DL
VLDLC SERPL-MCNC: 36 MG/DL
WBC # BLD AUTO: 8.5 K/UL (ref 4.5–11)

## 2022-10-12 PROCEDURE — 3079F PR MOST RECENT DIASTOLIC BLOOD PRESSURE 80-89 MM HG: ICD-10-PCS | Mod: ,,, | Performed by: OBSTETRICS & GYNECOLOGY

## 2022-10-12 PROCEDURE — 1159F PR MEDICATION LIST DOCUMENTED IN MEDICAL RECORD: ICD-10-PCS | Mod: ,,, | Performed by: OBSTETRICS & GYNECOLOGY

## 2022-10-12 PROCEDURE — 88142 CYTOPATH C/V THIN LAYER: CPT | Mod: GCY | Performed by: OBSTETRICS & GYNECOLOGY

## 2022-10-12 PROCEDURE — 82270 PR BLOOD OCCULT, BY PEROX, FECES, SINGLE, COLORECT SCRN: ICD-10-PCS | Mod: ,,, | Performed by: OBSTETRICS & GYNECOLOGY

## 2022-10-12 PROCEDURE — 3077F SYST BP >= 140 MM HG: CPT | Mod: ,,, | Performed by: OBSTETRICS & GYNECOLOGY

## 2022-10-12 PROCEDURE — 1160F PR REVIEW ALL MEDS BY PRESCRIBER/CLIN PHARMACIST DOCUMENTED: ICD-10-PCS | Mod: ,,, | Performed by: OBSTETRICS & GYNECOLOGY

## 2022-10-12 PROCEDURE — 99386 PREV VISIT NEW AGE 40-64: CPT | Mod: ,,, | Performed by: OBSTETRICS & GYNECOLOGY

## 2022-10-12 PROCEDURE — 81003 URINALYSIS, REFLEX TO URINE CULTURE: ICD-10-PCS | Mod: QW,,, | Performed by: CLINICAL MEDICAL LABORATORY

## 2022-10-12 PROCEDURE — 82270 OCCULT BLOOD FECES: CPT | Mod: ,,, | Performed by: OBSTETRICS & GYNECOLOGY

## 2022-10-12 PROCEDURE — 80061 LIPID PANEL: ICD-10-PCS | Mod: ,,, | Performed by: CLINICAL MEDICAL LABORATORY

## 2022-10-12 PROCEDURE — 3008F PR BODY MASS INDEX (BMI) DOCUMENTED: ICD-10-PCS | Mod: ,,, | Performed by: OBSTETRICS & GYNECOLOGY

## 2022-10-12 PROCEDURE — 85025 COMPLETE CBC W/AUTO DIFF WBC: CPT | Mod: ,,, | Performed by: CLINICAL MEDICAL LABORATORY

## 2022-10-12 PROCEDURE — 82306 VITAMIN D: ICD-10-PCS | Mod: ,,, | Performed by: CLINICAL MEDICAL LABORATORY

## 2022-10-12 PROCEDURE — 3077F PR MOST RECENT SYSTOLIC BLOOD PRESSURE >= 140 MM HG: ICD-10-PCS | Mod: ,,, | Performed by: OBSTETRICS & GYNECOLOGY

## 2022-10-12 PROCEDURE — 85025 CBC WITH DIFFERENTIAL: ICD-10-PCS | Mod: ,,, | Performed by: CLINICAL MEDICAL LABORATORY

## 2022-10-12 PROCEDURE — 80053 COMPREHENSIVE METABOLIC PANEL: ICD-10-PCS | Mod: ,,, | Performed by: CLINICAL MEDICAL LABORATORY

## 2022-10-12 PROCEDURE — 1160F RVW MEDS BY RX/DR IN RCRD: CPT | Mod: ,,, | Performed by: OBSTETRICS & GYNECOLOGY

## 2022-10-12 PROCEDURE — 3079F DIAST BP 80-89 MM HG: CPT | Mod: ,,, | Performed by: OBSTETRICS & GYNECOLOGY

## 2022-10-12 PROCEDURE — 82306 VITAMIN D 25 HYDROXY: CPT | Mod: ,,, | Performed by: CLINICAL MEDICAL LABORATORY

## 2022-10-12 PROCEDURE — 81003 URINALYSIS AUTO W/O SCOPE: CPT | Mod: QW,,, | Performed by: CLINICAL MEDICAL LABORATORY

## 2022-10-12 PROCEDURE — 3008F BODY MASS INDEX DOCD: CPT | Mod: ,,, | Performed by: OBSTETRICS & GYNECOLOGY

## 2022-10-12 PROCEDURE — 80061 LIPID PANEL: CPT | Mod: ,,, | Performed by: CLINICAL MEDICAL LABORATORY

## 2022-10-12 PROCEDURE — 1159F MED LIST DOCD IN RCRD: CPT | Mod: ,,, | Performed by: OBSTETRICS & GYNECOLOGY

## 2022-10-12 PROCEDURE — 80053 COMPREHEN METABOLIC PANEL: CPT | Mod: ,,, | Performed by: CLINICAL MEDICAL LABORATORY

## 2022-10-12 PROCEDURE — 36415 PR COLLECTION VENOUS BLOOD,VENIPUNCTURE: ICD-10-PCS | Mod: ,,, | Performed by: OBSTETRICS & GYNECOLOGY

## 2022-10-12 PROCEDURE — 99386 PR PREVENTIVE VISIT,NEW,40-64: ICD-10-PCS | Mod: ,,, | Performed by: OBSTETRICS & GYNECOLOGY

## 2022-10-12 PROCEDURE — 36415 COLL VENOUS BLD VENIPUNCTURE: CPT | Mod: ,,, | Performed by: OBSTETRICS & GYNECOLOGY

## 2022-10-12 RX ORDER — ESCITALOPRAM OXALATE 20 MG/1
20 TABLET ORAL DAILY
COMMUNITY
Start: 2022-10-03

## 2022-10-12 NOTE — PROGRESS NOTES
Lisa Roberson female  for   Chief Complaint   Patient presents with    Annual Exam     Patient is a repeat new patient last seen in . Patient is here is the annual exam with pap.      OB History          2    Para   1    Term                AB   1    Living   1         SAB        IAB        Ectopic        Multiple        Live Births                      PHI:  62-year-old  2, para 1, AB1  female who has had a robotic hysterectomy with BSO by Dr. Jeremy Wan on 2014 presents today for an examination.  She has not had a gynecologic exam in several years.  The patient states she does have urinary stress incontinence.  She was diagnosed by urodynamic study on 2013 with mixed urinary incontinence-both urinary stress incontinence as well as overactive bladder issues without intrinsic sphincter deficiency.                Patient states she does have sexual intercourse with her  but somewhat infrequent.  The patient has some episodes of urgency frequency as well mild incontinence and she does at times wear perineal protection but not all times.  She has decreased her caffeine intake.    She does not use any topical vaginal estrogen or any oral or transdermal estrogen replacement therapy.     Patient relates she does take 2000 units of vitamin D3 daily.    Patient is essentially a today with COVID-19 vaccinations and boosters.            Past Medical History:   Diagnosis Date    Herpes simplex virus (HSV) infection     Hypertension     Mental disorder       Past Surgical History:   Procedure Laterality Date    BACK SURGERY      CERVICAL BIOPSY  W/ LOOP ELECTRODE EXCISION      DILATION AND CURETTAGE OF UTERUS      HYSTERECTOMY      OOPHORECTOMY        Review of patient's allergies indicates:   Allergen Reactions    Varenicline Other (See Comments) and Rash        ROS:Pertinent items are noted in HPI.    Physical exam:    BP (!) 146/88 (BP Location: Right arm, Patient  "Position: Sitting)   Pulse 71   Temp 97 °F (36.1 °C)   Resp 16   Ht 5' 6" (1.676 m)   Wt 64.3 kg (141 lb 12.8 oz)   BMI 22.89 kg/m²      General Appearance: healthy, alert, no distress, smiling, BMI is 22.89    HEENT: normal exam    Lymphatic: no palpable lymphadenopathy, no hepatosplenomegaly    Chest:         Breasts:No dimpling, nipple retraction or discharge. No masses or nodes., Normal to inspection, and Normal breast tissue bilaterally         Lungs: clear to auscultation bilaterally and normal percussion bilaterally         Heart: regular rate and rhythm, S1, S2 normal, no murmur, click, rub or gallop and regular rate and rhythm    Abdomen:soft, non-tender, without masses or organomegaly, soft, normal bowel sounds, without guarding, without rebound, and well-healed robotic scar; no evidence of any ventral incisional hernia; no abdominal bruit; normal findings with no ascites    Pelvic:             Vulva:normal, normal female genitalia, Bartholin's, Urethra, Banner normal, no lesions, slight contracture at the vaginal introitus but admits 2 fingers in speculum easily            Vagina:  Flat vaginal rugae; no cystocele and no vault  Prolapse and no rectocele                       Uterus:  surgically absent uterus and cervix            Adnexae: surgically absent    Rectal:negative, stool guaiac negative    Extremity: normal, extremities warm, no clubbing, no cyanosis, no edema, non-tender; no CVA tenderness bilaterally; good back alignment; long lumbosacral midline surgical scar from prior laminectomies    Skin: normal exam        Assessment:   Problem List Items Addressed This Visit    None  Visit Diagnoses       Urinary, incontinence, stress female    -  Primary    Encounter for annual routine gynecological examination        Vaginitis, atrophic        Vitamin D deficiency        Urinary incontinence, mixed                 Plan:  Recommendation is screening labs; check vitamin-D; thin prep Pap; hemoccult " screen was negative today; recommend mammography on a yearly basis; most recent mammogram was a BI-RADS 1 on 09/30/2022.              Patient was advised she is a candidate for colonoscopy.

## 2022-10-12 NOTE — PATIENT INSTRUCTIONS
Dr. Jeremy Wan thanks you for this annual exam visit at [facility].    Please remember to perform monthly breast self exams. If you are over 40 years of age, Dr. Wan recommends yearly mammograms. Please check with your insurance carrier for mammogram insurance coverage.    Colonoscopy indication:      Low risk family history: schedule after age 50 for initial evaluation by a GI specialist.    High risk family history: Schedule before age 50 for initial evaluation by a GI specialist. High risk family history includes history of colon cancer in an offspring, brother or sister or parent.    The Annual Exam or periodic exam may includes thin prep Pap smear and appropriate ancillary labs as allowed by your insurance coverage.The studies Dr. Jeremy Wan ordered has been checked by our [employer] Electronic Medical Record software today.     Please use perscribed medications as directed.    Special considerations after this visit:  Recommend screening labs today; thin prep Pap was performed; hemoccult screen was performed and negative; recommend monthly breast self exam and yearly mammography.  Patient was offered medication for overactive bladder and urinary stress incontinence.  She does have a diagnosis of mixed urinary incontinence.  This was verified in 2013 by urodynamic study.    Patient has refused estrogen replacement therapy or topical vaginal estrogen for atrophic vaginitis.    She is continue on vitamin D3 therapy 2000 units daily as over-the-counter.  Vitamin-D will be checked today.    Please practice best food and exercise habits for your age.    Dr. Jeremy Wan recommends avoidance of smoking and illicit medications or habits.    Please call or schedule for any change in your health.     Dr. Jeremy Wan recommends yearly exams for good health maintenance even if you pap smear schedule (as allowed by your health insurance coverage)  does not allow yearly pap testing.    Dr. Jeremy BORGES  [place]    10/12/2022 3:20 PM

## 2022-10-13 LAB
GH SERPL-MCNC: NORMAL NG/ML
INSULIN SERPL-ACNC: NORMAL U[IU]/ML
LAB AP CLINICAL INFORMATION: NORMAL
LAB AP GYN INTERPRETATION: NEGATIVE
LAB AP PAP DISCLAIMER COMMENTS: NORMAL
RENIN PLAS-CCNC: NORMAL NG/ML/H

## 2023-02-11 ENCOUNTER — HOSPITAL ENCOUNTER (EMERGENCY)
Facility: HOSPITAL | Age: 63
Discharge: HOME | End: 2023-02-11
Payer: COMMERCIAL

## 2023-02-11 VITALS
RESPIRATION RATE: 18 BRPM | BODY MASS INDEX: 24.49 KG/M2 | DIASTOLIC BLOOD PRESSURE: 80 MMHG | SYSTOLIC BLOOD PRESSURE: 140 MMHG | OXYGEN SATURATION: 96 % | HEIGHT: 65 IN | WEIGHT: 147 LBS | HEART RATE: 84 BPM | TEMPERATURE: 99 F

## 2023-02-11 DIAGNOSIS — U07.1 COVID-19: Primary | ICD-10-CM

## 2023-02-11 PROCEDURE — 99283 EMERGENCY DEPT VISIT LOW MDM: CPT | Mod: CR,,, | Performed by: NURSE PRACTITIONER

## 2023-02-11 PROCEDURE — 99283 PR EMERGENCY DEPT VISIT,LEVEL III: ICD-10-PCS | Mod: CR,,, | Performed by: NURSE PRACTITIONER

## 2023-02-11 PROCEDURE — 99283 EMERGENCY DEPT VISIT LOW MDM: CPT

## 2023-02-11 NOTE — ED PROVIDER NOTES
Encounter Date: 2/11/2023       History     Chief Complaint   Patient presents with    COVID-19 Concerns     Patient reports nasal congestion and body aches that began Wed. Also reports positive home covid test on thursday     Patient presents to the ED with complaints of COVID. Patient reports she started feeling bad 3 days ago, took a home COVID test 2 days ago and it was positive, took another test yesterday and it was positive, states she has a history of COPD and would like the Paxlovid. Patient reports mostly congestion and body aches with COVID.    The history is provided by the patient.   Review of patient's allergies indicates:   Allergen Reactions    Varenicline Other (See Comments) and Rash     Past Medical History:   Diagnosis Date    Herpes simplex virus (HSV) infection     Hypertension     Mental disorder      Past Surgical History:   Procedure Laterality Date    BACK SURGERY      CERVICAL BIOPSY  W/ LOOP ELECTRODE EXCISION      DILATION AND CURETTAGE OF UTERUS      HYSTERECTOMY      OOPHORECTOMY       Family History   Problem Relation Age of Onset    Osteoarthritis Sister     Rheum arthritis Sister      Social History     Tobacco Use    Smoking status: Every Day     Types: Cigarettes    Smokeless tobacco: Never   Substance Use Topics    Alcohol use: Not Currently    Drug use: Yes     Types: Marijuana     Review of Systems   Constitutional: Negative.    HENT:  Positive for congestion.    Respiratory:  Positive for cough.    Cardiovascular: Negative.    Musculoskeletal:  Positive for myalgias.   Skin: Negative.    Neurological: Negative.    Psychiatric/Behavioral: Negative.     All other systems reviewed and are negative.    Physical Exam     Initial Vitals [02/11/23 1103]   BP Pulse Resp Temp SpO2   (!) 140/80 84 18 98.5 °F (36.9 °C) 96 %      MAP       --         Physical Exam    Vitals reviewed.  Constitutional: She appears well-developed and well-nourished.   HENT:   Head: Normocephalic and  atraumatic.   Mouth/Throat: Oropharynx is clear and moist.   Neck: Neck supple.   Normal range of motion.  Cardiovascular:  Normal rate, regular rhythm, normal heart sounds and intact distal pulses.           Pulmonary/Chest: Breath sounds normal.   Musculoskeletal:         General: Normal range of motion.      Cervical back: Normal range of motion and neck supple.     Neurological: She is alert and oriented to person, place, and time. She has normal strength. GCS score is 15. GCS eye subscore is 4. GCS verbal subscore is 5. GCS motor subscore is 6.   Skin: Skin is warm and dry. Capillary refill takes less than 2 seconds.   Psychiatric: She has a normal mood and affect. Her behavior is normal. Judgment and thought content normal.       Medical Screening Exam   See Full Note    ED Course   Procedures  Labs Reviewed - No data to display       Imaging Results    None          Medications - No data to display  Medical Decision Making:   Initial Assessment:   Patient appears mildly ill, oriented x3, awake, alert, able to converse in full sentences, concerned that she has COPD.   ED Management:  MDM    Patient presents for emergent evaluation of acute congestion, body aches, cough and COVID positive that poses a threat to life and/or bodily function.    In the ED patient found to have acute COVID infection.        Discharge MDM  I discussed the discharge/treatment plan with the patient, will prescribe Paxlovid on discharge.  Patient was discharged in stable condition.  Detailed return precautions discussed.                  Clinical Impression:   Final diagnoses:  [U07.1] COVID-19 (Primary)        ED Disposition Condition    Discharge Stable          ED Prescriptions       Medication Sig Dispense Start Date End Date Auth. Provider    nirmatrelvir-ritonavir 300 mg (150 mg x 2)-100 mg copackaged tablets (EUA) Take 3 tablets by mouth 2 (two) times daily for 5 days. Each dose contains 2 nirmatrelvir (pink tablets) and 1  ritonavir (white tablet). Take all 3 tablets together 30 tablet 2/11/2023 2/16/2023 RUSTY Desouza          Follow-up Information    None          RUSTY Desouza  02/11/23 8700

## 2023-06-01 ENCOUNTER — HOSPITAL ENCOUNTER (OUTPATIENT)
Dept: RADIOLOGY | Facility: HOSPITAL | Age: 63
Discharge: HOME | End: 2023-06-01
Attending: NURSE PRACTITIONER
Payer: COMMERCIAL

## 2023-06-01 DIAGNOSIS — M25.551 RIGHT HIP PAIN: Primary | ICD-10-CM

## 2023-06-01 DIAGNOSIS — M54.50 LUMBAGO: ICD-10-CM

## 2023-06-01 DIAGNOSIS — M25.551 RIGHT HIP PAIN: ICD-10-CM

## 2023-06-01 PROCEDURE — 73030 X-RAY EXAM OF SHOULDER: CPT | Mod: TC,RT

## 2023-06-01 PROCEDURE — 72100 X-RAY EXAM L-S SPINE 2/3 VWS: CPT | Mod: TC

## 2023-11-30 DIAGNOSIS — M75.41 IMPINGEMENT SYNDROME OF RIGHT SHOULDER: ICD-10-CM

## 2023-11-30 DIAGNOSIS — S43.51XD SPRAIN OF RIGHT ACROMIOCLAVICULAR JOINT, SUBSEQUENT ENCOUNTER: ICD-10-CM

## 2023-11-30 DIAGNOSIS — M25.511 RIGHT SHOULDER PAIN: Primary | ICD-10-CM

## 2023-12-05 ENCOUNTER — CLINICAL SUPPORT (OUTPATIENT)
Dept: REHABILITATION | Facility: HOSPITAL | Age: 63
End: 2023-12-05
Payer: COMMERCIAL

## 2023-12-05 DIAGNOSIS — M25.511 ACUTE PAIN OF RIGHT SHOULDER: ICD-10-CM

## 2023-12-05 DIAGNOSIS — M75.121 COMPLETE TEAR OF RIGHT ROTATOR CUFF, UNSPECIFIED WHETHER TRAUMATIC: ICD-10-CM

## 2023-12-05 DIAGNOSIS — M25.511 RIGHT SHOULDER PAIN: ICD-10-CM

## 2023-12-05 DIAGNOSIS — S43.51XD SPRAIN OF RIGHT ACROMIOCLAVICULAR JOINT, SUBSEQUENT ENCOUNTER: ICD-10-CM

## 2023-12-05 DIAGNOSIS — M25.511 CHRONIC RIGHT SHOULDER PAIN: ICD-10-CM

## 2023-12-05 DIAGNOSIS — G89.29 CHRONIC RIGHT SHOULDER PAIN: ICD-10-CM

## 2023-12-05 DIAGNOSIS — M75.21 BICIPITAL TENDINITIS, RIGHT: ICD-10-CM

## 2023-12-05 DIAGNOSIS — M25.511 RIGHT SHOULDER PAIN, UNSPECIFIED CHRONICITY: Primary | ICD-10-CM

## 2023-12-05 DIAGNOSIS — M75.41 IMPINGEMENT SYNDROME OF RIGHT SHOULDER: ICD-10-CM

## 2023-12-05 PROBLEM — S43.51XA SPRAIN OF RIGHT ACROMIOCLAVICULAR JOINT: Status: ACTIVE | Noted: 2023-12-05

## 2023-12-05 PROCEDURE — 97161 PT EVAL LOW COMPLEX 20 MIN: CPT

## 2023-12-05 NOTE — PROGRESS NOTES
"[facility]  Physical Therapy Initial Evaluation    Name: Lisa Roberson  Clinic Number: [id number]    Therapy Diagnosis:   Encounter Diagnoses   Name Primary?    Right shoulder pain, unspecified chronicity Yes    Sprain of right acromioclavicular joint, subsequent encounter     Bicipital tendinitis, right     Complete tear of right rotator cuff, unspecified whether traumatic      Physician: Hemant Arrieta MD    Physician Orders: PT Eval and Treat  Medical Diagnosis from Referral: pain in right shoulder, sprain of right acromioclavicular joint, bicipital tendinitis, and complete tear of right rotator cuff  Evaluation Date: 2023  Authorization Period Expiration: ***  Plan of Care Expiration: 2024  Visit # / Visits authorized: /***    Time In: ***  Time Out: ***  Total Appointment Time (timed & untimed codes): *** minutes    Precautions: {IP WOUND PRECAUTIONS OHS:06819}    Subjective     Date of onset: ***    History of current condition - Bettie reports: ***     Falls: ***    Imaging: {Mri/ctscan/bone scan:49550}: ***    Prior Therapy: ***  Social History: {LIVES WITH:87564}  Steps/Stairs: ***  Occupation: ***  Driving: {[employer]}  Durable Medical Equipment: ***  Dominant Extremity: {RIGHT/LEFT:}  Affected Extremity: {RIGHT/LEFT/BOTH:}  Prior Level of Function: ***  Current Level of Function: ***    Pain:  Current {0-10:::"0"}/10, worst {0-10:::"0"}/10, best {0-10:::"0"}/10   Location: {RIGHT LEFT BILATERAL:59441} {LOCATION ON BODY:95055}  Description: {Pain Description:14615}  Aggravating Factors: {Causes; Pain:58622}  Easing Factors: {Pain (activities that relieve):59752}    Pts goals: ***    Medical History:   Past Medical History:   Diagnosis Date    COPD (chronic obstructive pulmonary disease)     Herpes simplex virus (HSV) infection     Hypertension     Mental disorder        Surgical History:   Lisa LANDIN Da  has " "a past surgical history that includes Hysterectomy; Oophorectomy; Cervical biopsy w/ loop electrode excision; Back surgery; and Dilation and curettage of uterus.    Medications:   Lisa has a current medication list which includes the following prescription(s): alprazolam, breztri aerosphere, clonidine, escitalopram oxalate, gabapentin, meloxicam, tizanidine, and valsartan-hydrochlorothiazide.    Allergies:   Review of patient's allergies indicates:   Allergen Reactions    Varenicline Other (See Comments) and Rash      Objective     Posture:  Forward head: {YES/NO:20292}  Cervical curve: {DESC INCREASED/DECREASED:57992}  Thoracic curve: {DESC INCREASED/DECREASED:29005}  Laterally flexed: {RIGHT /LEFT:97531}  Rotated: {RIGHT /LEFT:36649}  Rounded shoulders: {YES/NO:42643}  Scoliosis: {YES/NO:96718}  Shoulder height: {DMJLEFTRIGHTINCREASEDEQUAL:73231}  Clavical height: {DMJLEFTRIGHTINCREASEDEQUAL:13135}    Clear cervical spine: Spurling's test {Exam:20264::"negative"}    Range of motion:   Right  Left    Shoulder flexion {DMJWFL:65833} {DMJWFL:17599}   Shoulder extension {DMJWFL:49179} {DMJWFL:44036}   Shoulder abduction {DMJWFL:32783} {DMJWFL:60307}   Shoulder internal rotation {DMJWFL:89969} {DMJWFL:45585}   Shoulder external rotation {DMJWFL:34898} {DMJWFL:44927}   Elbow flexion {DMJWFL:21681} {DMJWFL:61245}   Elbow extension {DMJWFL:37035} {DMJWFL:94051}     Manual muscle testing:   Right  Left    Shoulder flexion {MMT strength:17438:::1} {MMT strength:48462:::1}   Shoulder extension {MMT strength:10904:::1} {MMT strength:65038:::1}   Shoulder abduction {MMT strength:39053:::1} {MMT strength:64962:::1}   Shoulder internal rotation {MMT strength:97663:::1} {MMT strength:71603:::1}   Shoulder external rotation {MMT strength:66804:::1} {MMT strength:77887:::1}   Elbow flexion {MMT strength:24453:::1} {MMT strength:48722:::1}   Elbow extension {MMT strength:20740:::1} {MMT strength:37486:::1}     Clinical Special " "Tests:  Apprehension test: right {POSITIVE/NEGATIVE:38751}; left {POSITIVE/NEGATIVE:29522}  Neer's test: right {POSITIVE/NEGATIVE:36965}; left {POSITIVE/NEGATIVE:79580}  Scour test: right {POSITIVE/NEGATIVE:86690}; left {POSITIVE/NEGATIVE:53451}  O'katrin's test: right {POSITIVE/NEGATIVE:81112}; left {POSITIVE/NEGATIVE:48165}  Drop arm test: right {POSITIVE/NEGATIVE:53642}; left {POSITIVE/NEGATIVE:71713}  Empty can test: right {POSITIVE/NEGATIVE:40339}; left {POSITIVE/NEGATIVE:79583}  Hawkin's Abhishek test: right {POSITIVE/NEGATIVE:69843}; left {POSITIVE/NEGATIVE:27598}    Incision: ***    Palpation: ***    Limitation/Restriction for FOTO Intake Survey    Therapist reviewed FOTO scores for Lisa Roberson on 12/5/2023.   FOTO documents entered into EPIC - see Media section.    Limitation Score: ***%       Patient Education and Home Exercises     Education provided: Patient educated on the importance of completing skilled physical therapy treatment and home exercise program as prescribed to maximize functional gains.     Written Home Exercises Provided: yes. Exercises were reviewed and Bettie was able to demonstrate them prior to the end of the session. Bettie demonstrated {Desc; good/fair/poor:81718}understanding of the education provided.     See EMR under "Patient Instructions" for exercises provided during therapy sessions.    Assessment     Lisa is a 63 y.o. female referred to outpatient physical therapy with a medical diagnosis of pain in right shoulder, sprain of right acromioclavicular joint, bicipital tendinitis, and complete tear of right rotator cuff. Pt presents to PT ***.    Pt prognosis is {REHAB PROGNOSIS OHS:49669}.   Pt will benefit from skilled outpatient Physical Therapy to address the deficits stated above and in the chart below, provide pt/family education, and to maximize pt's level of independence.     Plan of care discussed with patient: Yes  Pt's spiritual, cultural and educational needs " considered and pt agreeable to plan of care and goals as stated below:     Anticipated Barriers for therapy: compliance with home exercise program; ***    Medical Necessity is demonstrated by the following:  History  Co-morbidities and personal factors that may impact the plan of care [] LOW: no personal factors / co-morbidities  [] MODERATE: 1-2 personal factors / co-morbidities  [] HIGH: 3+ personal factors / co-morbidities    Moderate / High Support Documentation:   Co-morbidities affecting plan of care: ***    Personal Factors:   {Personal Factors:23682}     Examination  Body Structures and Functions, activity limitations and participation restrictions that may impact the plan of care [] LOW: addressing 1-2 elements  [] MODERATE: 3+ elements  [] HIGH: 4+ elements (please support below)    Moderate / High Support Documentation: ***     Clinical Presentation [] LOW: stable  [] MODERATE: Evolving  [] HIGH: Unstable     Decision Making/ Complexity Score: {Desc; low/moderate/high:983713}       Goals:    Short Term Goals:  Patient will demonstrate independence with home exercise program to ensure carryover of treatment.  Patient will demonstrate *** degrees of right shoulder flexion active range of motion to improve functional use of the right upper extremity.  Patient will demonstrate *** degrees of right shoulder abduction active range of motion to improve functional use of the right upper extremity.  Patient will demonstrate *** degrees of right shoulder external rotation at 0 degrees abduction to improve functional use of the right upper extremity.  Patient will demonstrate functional right shoulder internal rotation to *** to improve functional use of the right upper extremity.   Patient will improve right upper extremity strength to {[facility]632[facility]} to improve functional use of the right upper extremity.   Patient will report a reduction in right shoulder pain from ***/10 to ***/10 at worst to improve  quality of life.     Long Term Goals:  Patient will demonstrate *** degrees of right shoulder flexion active range of motion to improve functional use of the right upper extremity.  Patient will demonstrate *** degrees of right shoulder abduction active range of motion to improve functional use of the right upper extremity.  Patient will demonstrate *** degrees of right shoulder external rotation at 0 degrees abduction to improve functional use of the right upper extremity.  Patient will demonstrate functional right shoulder internal rotation to *** to improve functional use of the right upper extremity.   Patient will improve right upper extremity strength to {[facility]:55793} to improve functional use of the right upper extremity.   Patient will report a reduction in right shoulder pain from ***/10 to ***/10 at worst to improve quality of life.     Plan     Plan of care Certification: 12/5/2023 to 1/19/2024.    Outpatient Physical Therapy 2 times weekly for 6 weeks to include the following interventions: {[place] PLAN:75147}.       Trev Hutton, PT, DPT  12/5/2023      Physician's Signature: _________________________________________  Date: __________________________

## 2023-12-05 NOTE — PLAN OF CARE
[facility]  Physical Therapy Initial Evaluation    Name: Lisa Roberson  Clinic Number: [id number]    Therapy Diagnosis:   Encounter Diagnoses   Name Primary?    Right shoulder pain, unspecified chronicity Yes    Sprain of right acromioclavicular joint, subsequent encounter     Bicipital tendinitis, right     Complete tear of right rotator cuff, unspecified whether traumatic      Physician: Hemant Arrieta MD    Physician Orders: PT Eval and Treat  Medical Diagnosis from Referral: pain in right shoulder, sprain of right acromioclavicular joint, bicipital tendinitis, and complete tear of right rotator cuff  Evaluation Date: 12/5/2023  Date of Surgery: 11/20/2023  Authorization Period Expiration: 11/29/2024  Plan of Care Expiration: 1/19/2024  Visit # / Visits authorized: 1/13 (including initial evaluation)    Time In: 1120 (Patient 16 minutes late for today's appointment.)  Time Out: 1150  Total Appointment Time (timed & untimed codes): 30 minutes    Precautions: Standard; Passive and active-assisted range of motion only at this time.    Subjective     Date of onset: 11/20/2023    History of current condition - Bettie reports she is status post a right rotator cuff repair on 11/20/2023 by Dr. Hemant Arrieta. Patient with orders for passive and active-assisted range of motion of the right shoulder only. Patient returns to Dr. Arrieta on 1/4/2024.     Falls: none    Imaging: See available imaging in Epic. No post-op imaging of the right shoulder available to view.     Prior Therapy: none for the right shoulder since surgery  Social History: lives with their spouse  Occupation: Disabled  Driving: Yes  Durable Medical Equipment: right upper extremity ultra sling  Dominant Extremity: right  Affected Extremity: right  Prior Level of Function: independent with all functional mobility, ADLs, and self-care  Current Level of Function: same as prior level of function but with increased  time and effort    Pain:  Current 5/10, worst 8/10, best 2/10   Location: right shoulder  Description: Aching  Aggravating Factors: no known aggravating factors  Easing Factors: pain medication, ice, and rest    Pts goals: Patient wishes to regain full use of her right upper extremity to improve her quality of life.     Medical History:   Past Medical History:   Diagnosis Date    COPD (chronic obstructive pulmonary disease)     Herpes simplex virus (HSV) infection     Hypertension     Mental disorder      Surgical History:   Lisa Roberson  has a past surgical history that includes Hysterectomy; Oophorectomy; Cervical biopsy w/ loop electrode excision; Back surgery; and Dilation and curettage of uterus.    Medications:   Lisa has a current medication list which includes the following prescription(s): alprazolam, breztri aerosphere, clonidine, escitalopram oxalate, gabapentin, meloxicam, tizanidine, and valsartan-hydrochlorothiazide.    Allergies:   Review of patient's allergies indicates:   Allergen Reactions    Varenicline Other (See Comments) and Rash      Objective     Posture:  Forward head: no  Rounded shoulders: yes  Shoulder height: equal  Clavical height: equal    Clear cervical spine: Spurling's test negative    Range of motion:   Right  Left    Shoulder flexion 140* passive 140*   Shoulder extension WFL WFL   Shoulder abduction 132* passive 140*   Shoulder internal rotation Not assessed per protocol T8   Shoulder external rotation 70* passive 75*   Elbow flexion WFL WFL   Elbow extension WFL WFL     Manual muscle testing:   Right  Left    Shoulder flexion Not assessed per protocol MMT strength: 4/5   Shoulder extension Not assessed per protocol MMT strength: 4/5   Shoulder abduction Not assessed per protocol MMT strength: 4/5   Shoulder internal rotation Not assessed per protocol MMT strength: 4/5   Shoulder external rotation Not assessed per protocol MMT strength: 4/5   Elbow flexion MMT  "strength: 4/5 MMT strength: 4/5   Elbow extension MMT strength: 4/5 MMT strength: 4/5     Incision: Patient reports her right anterior shoulder incision became infected, and she is currently taking antibiotics. Redness noted    Palpation: N/A    Limitation/Restriction for FOTO Intake Survey    Therapist reviewed FOTO scores for Lisa Roberson on 12/5/2023.   FOTO documents entered into [employer] - see Media section.    Limitation Score: 43.6%       Patient Education and Home Exercises     Education provided: Patient educated on the importance of completing skilled physical therapy treatment and home exercise program as prescribed to maximize functional gains.     Written Home Exercises Provided: yes. Exercises were reviewed and Bettie was able to demonstrate them prior to the end of the session. Bettie demonstrated good understanding of the education provided.     See EMR under "Patient Instructions" for exercises provided during therapy sessions.    Assessment     Lisa is a 63 y.o. female referred to outpatient physical therapy with a medical diagnosis of pain in right shoulder, sprain of right acromioclavicular joint, bicipital tendinitis, and complete tear of right rotator cuff. Pt presents to PT with right shoulder pain, decreased right shoulder range of motion, and right upper extremity weakness following rotator cuff repair of the right shoulder on 11/20/2023. Patient will remain passive range of motion/active-assisted range of motion of the right upper extremity only until she returns to Dr. Arrieta on 1/4/2024..    Pt prognosis is Good.   Pt will benefit from skilled outpatient Physical Therapy to address the deficits stated above and in the chart below, provide pt/family education, and to maximize pt's level of independence.     Plan of care discussed with patient: Yes  Pt's spiritual, cultural and educational needs considered and pt agreeable to plan of care and goals as stated below:     Anticipated " Barriers for therapy: compliance with home exercise program; natural course of healing    Medical Necessity is demonstrated by the following:  History  Co-morbidities and personal factors that may impact the plan of care [x] LOW: no personal factors / co-morbidities  [] MODERATE: 1-2 personal factors / co-morbidities  [] HIGH: 3+ personal factors / co-morbidities    Moderate / High Support Documentation:   Co-morbidities affecting plan of care: N/A    Personal Factors:   no deficits     Examination  Body Structures and Functions, activity limitations and participation restrictions that may impact the plan of care [x] LOW: addressing 1-2 elements  [] MODERATE: 3+ elements  [] HIGH: 4+ elements (please support below)    Moderate / High Support Documentation: range of motion and strength     Clinical Presentation [x] LOW: stable  [] MODERATE: Evolving  [] HIGH: Unstable     Decision Making/ Complexity Score: low       Goals:    Short Term Goals:  Patient will demonstrate independence with home exercise program to ensure carryover of treatment.  Patient will demonstrate 90 degrees of right shoulder flexion/abduction active range of motion to improve functional use of the right upper extremity.  Patient will demonstrate 35 degrees of right shoulder external rotation at 0 degrees abduction to improve functional use of the right upper extremity.  Patient will demonstrate functional right shoulder internal rotation to L4 to improve functional use of the right upper extremity.   Patient will improve right upper extremity strength to 3+/5 to improve functional use of the right upper extremity.     Long Term Goals:  Patient will demonstrate 140 degrees of right shoulder flexion/abduction active range of motion to improve functional use of the right upper extremity.  Patient will demonstrate 70 degrees of right shoulder external rotation at 0 degrees abduction to improve functional use of the right upper extremity.  Patient  will demonstrate functional right shoulder internal rotation to T8 to improve functional use of the right upper extremity.   Patient will improve right upper extremity strength to 4+/5 to improve functional use of the right upper extremity.   Patient will demonstrate the ability to place a 5lb weight on/off an overhead shelf with the right upper extremity with proper scapulohumeral rhythm to improve functional use of the right upper extremity.    Plan     Plan of care Certification: 12/5/2023 to 1/19/2024.    Outpatient Physical Therapy 2 times weekly for 6 weeks to include the following interventions: Electrical Stimulation (IFC/premodulated IFC), Manual Therapy, Moist Heat/ Ice, Neuromuscular Re-ed, Patient Education, Therapeutic Activities, and Therapeutic Exercise.       Trev Hutton, PT, DPT  12/5/2023      Physician's Signature: _________________________________________  Date: __________________________

## 2023-12-08 ENCOUNTER — CLINICAL SUPPORT (OUTPATIENT)
Dept: REHABILITATION | Facility: HOSPITAL | Age: 63
End: 2023-12-08
Payer: COMMERCIAL

## 2023-12-08 DIAGNOSIS — M75.21 BICIPITAL TENDINITIS, RIGHT: ICD-10-CM

## 2023-12-08 DIAGNOSIS — S43.51XD SPRAIN OF RIGHT ACROMIOCLAVICULAR JOINT, SUBSEQUENT ENCOUNTER: ICD-10-CM

## 2023-12-08 DIAGNOSIS — M75.121 COMPLETE TEAR OF RIGHT ROTATOR CUFF, UNSPECIFIED WHETHER TRAUMATIC: ICD-10-CM

## 2023-12-08 DIAGNOSIS — M25.511 RIGHT SHOULDER PAIN, UNSPECIFIED CHRONICITY: Primary | ICD-10-CM

## 2023-12-08 PROCEDURE — 97010 HOT OR COLD PACKS THERAPY: CPT | Mod: CQ

## 2023-12-08 PROCEDURE — 97140 MANUAL THERAPY 1/> REGIONS: CPT | Mod: CQ

## 2023-12-08 PROCEDURE — 97110 THERAPEUTIC EXERCISES: CPT | Mod: CQ

## 2023-12-08 PROCEDURE — 97014 ELECTRIC STIMULATION THERAPY: CPT | Mod: CQ

## 2023-12-08 PROCEDURE — 97112 NEUROMUSCULAR REEDUCATION: CPT | Mod: CQ

## 2023-12-08 NOTE — PROGRESS NOTES
[facility]  Physical Therapy Treatment Note    Name: Lisa Roberson  Clinic Number: [id number]    Therapy Diagnosis:   Encounter Diagnoses   Name Primary?    Right shoulder pain, unspecified chronicity Yes    Sprain of right acromioclavicular joint, subsequent encounter     Bicipital tendinitis, right     Complete tear of right rotator cuff, unspecified whether traumatic      Physician: Hemant Arrieta MD    Visit Date: 12/8/2023    Physician Orders: PT Eval and Treat  Medical Diagnosis from Referral: pain in right shoulder, sprain of right acromioclavicular joint, bicipital tendinitis, and complete tear of right rotator cuff  Evaluation Date: 12/5/2023  Date of Surgery: 11/20/2023  Authorization Period Expiration: 11/29/2024  Plan of Care Expiration: 1/19/2024  Visit # / Visits authorized: 2/13 (including initial evaluation)  PTA Visit #: 1    Time In: 1019  Time Out: 1114  Total Billable Time: 55 minutes    Precautions: Standard and passive and active-assisted range of motion only at this time    Next MD visit: 1/4/2024    Subjective     Pt reports: her shoulder has been hurting her the past two mornings. Patient arrived to treatment without her sling and stated her MD told her she didn't have to wear it at home and since she was just coming here she decided not to wear it    She was compliant with home exercise program.  Response to previous treatment: first treatment since initial evaluation    Pain: 7/10  Location: right shoulder      Objective     Bettie received therapeutic exercises to develop strength, endurance, ROM, flexibility, and posture for 15 minutes including:    UEB: x 5 min (backwards)  Pulley x 5 min  Counter slides: x 15 reps x 5 sec hold at pain free end range  Supine cane flexion: x 20 reps x 5 sec hold at pain free end range     Bettie received the following manual therapy techniques: were applied to the: right shoulder for 10 minutes,  including:    Passive range of motion of the right shoulder into flexion, abduction, external rotation with stretch at pain free end range  Joint mobilizations (grades 1-3) of the glenohumeral joint to improve lubrication and range of motion    Bettie participated in neuromuscular re-education activities to improve: Posture for 15 minutes. The following activities were included:    Supine scapular retraction: x 20 reps x 5 sec hold  Sidelying external rotation: active assistive range of motion; x 20 reps  Scapular proprioceptive neuromuscular facilitation: x 20 reps    Bettie received the following supervised modalities after being cleared for contradictions: premodulated IFC Electrical Stimulation:  Lisa received IFC Electrical Stimulation for pain control applied to the right shoulder. Pt received stimulation at and intensity of 13 V at a frequency of 80/150 Hz for 15 minutes. Lisa tolderated treatment well without any adverse effects.      Bettie received cold pack for 15 minutes to right shoulder in conjunction with premodulated IFC electrical stimulation.     Right Shoulder AROM Right Shoulder PROM   Flexion N/A degrees 140 degrees   External Rotation at 0 Degrees Abduction N/A degrees 70 degrees   Functional Internal Rotation Not assessed per protocol N/A     Home Exercises Provided and Patient Education Provided     Education provided: Patient educated on the importance of completing skilled physical therapy treatment and home exercise program as prescribed to maximize functional gains.    Written Home Exercises Provided: Patient instructed to cont prior HEP. Exercises were reviewed and Bettie was able to demonstrate them prior to the end of the session.  Bettie demonstrated good  understanding of the education provided.     See EMR under Patient Instructions for exercises provided  during therapy sessions .    Assessment     Patient with good effort overall. Patient able to complete all exercises, within  passive range of motion and active assistive range of motion protocols, without complaint of increase in pain. Patient with good tolerance of manual stretching to right shoulder in all directions. Premodulated IFC electrical stimulation applied post treatment to decrease pain. Patient stated her shoulder felt much better post treatment.    Bettie isprogressing well towards her goals.   Pt prognosis is Good.     Pt will continue to benefit from skilled outpatient physical therapy to address the deficits listed in the problem list box on initial evaluation, provide pt/family education, and to maximize pt's level of independence in the home and community environment.     Pt's spiritual, cultural, and educational needs considered and pt agreeable to plan of care and goals.     Anticipated barriers to physical therapy: compliance with home exercise program; natural course of healing       Goals:    Short Term Goals:  Patient will demonstrate independence with home exercise program to ensure carryover of treatment.  Patient will demonstrate 90 degrees of right shoulder flexion/abduction active range of motion to improve functional use of the right upper extremity.  Patient will demonstrate 35 degrees of right shoulder external rotation at 0 degrees abduction to improve functional use of the right upper extremity.  Patient will demonstrate functional right shoulder internal rotation to L4 to improve functional use of the right upper extremity.   Patient will improve right upper extremity strength to 3+/5 to improve functional use of the right upper extremity.      Long Term Goals:  Patient will demonstrate 140 degrees of right shoulder flexion/abduction active range of motion to improve functional use of the right upper extremity.  Patient will demonstrate 70 degrees of right shoulder external rotation at 0 degrees abduction to improve functional use of the right upper extremity.  Patient will demonstrate functional right  shoulder internal rotation to T8 to improve functional use of the right upper extremity.   Patient will improve right upper extremity strength to 4+/5 to improve functional use of the right upper extremity.   Patient will demonstrate the ability to place a 5lb weight on/off an overhead shelf with the right upper extremity with proper scapulohumeral rhythm to improve functional use of the right upper extremity.    Plan     Patient will continue to benefit from skilled physical therapy treatment as prescribed working towards goals listed above to maximize functional potential.       Matthew Miller, MARVIN  12/8/2023

## 2023-12-11 ENCOUNTER — CLINICAL SUPPORT (OUTPATIENT)
Dept: REHABILITATION | Facility: HOSPITAL | Age: 63
End: 2023-12-11
Payer: COMMERCIAL

## 2023-12-11 DIAGNOSIS — M75.21 BICIPITAL TENDINITIS, RIGHT: ICD-10-CM

## 2023-12-11 DIAGNOSIS — M25.511 RIGHT SHOULDER PAIN, UNSPECIFIED CHRONICITY: Primary | ICD-10-CM

## 2023-12-11 DIAGNOSIS — M75.121 COMPLETE TEAR OF RIGHT ROTATOR CUFF, UNSPECIFIED WHETHER TRAUMATIC: ICD-10-CM

## 2023-12-11 PROCEDURE — 97110 THERAPEUTIC EXERCISES: CPT

## 2023-12-11 PROCEDURE — 97014 ELECTRIC STIMULATION THERAPY: CPT

## 2023-12-11 PROCEDURE — 97010 HOT OR COLD PACKS THERAPY: CPT

## 2023-12-11 PROCEDURE — 97140 MANUAL THERAPY 1/> REGIONS: CPT

## 2023-12-11 PROCEDURE — 97112 NEUROMUSCULAR REEDUCATION: CPT

## 2023-12-11 NOTE — PROGRESS NOTES
[facility]  Physical Therapy Treatment Note    Name: Lisa Roberson  Clinic Number: [id number]    Therapy Diagnosis:   Encounter Diagnoses   Name Primary?    Right shoulder pain, unspecified chronicity Yes    Bicipital tendinitis, right     Complete tear of right rotator cuff, unspecified whether traumatic      Physician: Hemant Arrieta MD    Visit Date: 12/11/2023    Physician Orders: PT Eval and Treat  Medical Diagnosis from Referral: pain in right shoulder, sprain of right acromioclavicular joint, bicipital tendinitis, and complete tear of right rotator cuff  Evaluation Date: 12/5/2023  Date of Surgery: 11/20/2023  Authorization Period Expiration: 11/29/2024  Plan of Care Expiration: 1/19/2024  Visit # / Visits authorized: 3/13 (including initial evaluation)  PTA Visit #: 0    Time In: 1404  Time Out: 1458  Total Billable Time: 54 minutes    Precautions: Standard and passive and active-assisted range of motion only at this time    Next MD visit: 1/4/2024    Subjective     Pt reports: She has been wearing her right shoulder sling some due to intermittently high levels of right shoulder pain. Patient arrived to treatment without her sling this afternoon.    She was compliant with home exercise program.  Response to previous treatment: good; no increase in pain    Pain: 5/10  Location: right shoulder      Objective     Bettie received therapeutic exercises to develop strength, endurance, ROM, flexibility, and posture for 16 minutes including:    UBE: x 5 minutes (backwards; left upper extremity guiding motion)  Pulleys: x 5 minutes  Counter slides: x 15 reps with 5 second holds  Supine cane flexion: x 20 reps with 5 second holds     Bettie received the following manual therapy techniques: were applied to the: right shoulder for 15 minutes, including:    Passive range of motion of the right shoulder into flexion, abduction, external rotation with stretch at pain free end  range  Joint mobilizations (grades 1-3) of the glenohumeral joint to improve lubrication and range of motion  Myofascial release to the musculature surrounding the right shoulder, specifically the right upper arm    Bettie participated in neuromuscular re-education activities to improve: Posture for 8 minutes. The following activities were included:    Sitting scapular retractions: x 20 reps with 3 second holds  Supine scapular retraction: x 20 reps with 5 second holds  Right scapular proprioceptive neuromuscular facilitation in left sidelying: 2 x 10 reps    Sidelying external rotation: active assistive range of motion; x 20 reps (not performed)     Bettie received the following supervised modalities after being cleared for contradictions: premodulated IFC Electrical Stimulation for pain control applied to the right shoulder. Pt received stimulation at and intensity of 9 V and a frequency of  Hz for 15 minutes. Lisa tolderated treatment well without any adverse effects.      Bettie received cold pack for 15 minutes to right shoulder in conjunction with premodulated IFC electrical stimulation.     Right Shoulder AROM Right Shoulder PROM   Flexion N/A degrees 140 degrees   External Rotation at 0 Degrees Abduction N/A degrees 70 degrees   Functional Internal Rotation Not assessed per protocol N/A     Home Exercises Provided and Patient Education Provided     Education provided: Patient educated on the importance of completing skilled physical therapy treatment and home exercise program as prescribed to maximize functional gains.    Written Home Exercises Provided: Patient instructed to cont prior HEP. Exercises were reviewed and Bettie was able to demonstrate them prior to the end of the session.  Bettie demonstrated good  understanding of the education provided.     See EMR under Patient Instructions for exercises provided  during therapy sessions .    Assessment     Patient with good effort throughout  treatment. Patient able to complete all exercises, within passive range of motion and active assisted range of motion protocol, without complaints of increased pain. Premodulated IFC electrical stimulation and ice applied to the right shoulder post treatment to decrease pain. Patient reported a reduction in right shoulder pain post-treatment.    Bettie isprogressing well towards her goals.   Pt prognosis is Good.     Pt will continue to benefit from skilled outpatient physical therapy to address the deficits listed in the problem list box on initial evaluation, provide pt/family education, and to maximize pt's level of independence in the home and community environment.     Pt's spiritual, cultural, and educational needs considered and pt agreeable to plan of care and goals.     Anticipated barriers to physical therapy: compliance with home exercise program; natural course of healing       Goals:    Short Term Goals:  Patient will demonstrate independence with home exercise program to ensure carryover of treatment.  Patient will demonstrate 90 degrees of right shoulder flexion/abduction active range of motion to improve functional use of the right upper extremity.  Patient will demonstrate 35 degrees of right shoulder external rotation at 0 degrees abduction to improve functional use of the right upper extremity.  Patient will demonstrate functional right shoulder internal rotation to L4 to improve functional use of the right upper extremity.   Patient will improve right upper extremity strength to 3+/5 to improve functional use of the right upper extremity.      Long Term Goals:  Patient will demonstrate 140 degrees of right shoulder flexion/abduction active range of motion to improve functional use of the right upper extremity.  Patient will demonstrate 70 degrees of right shoulder external rotation at 0 degrees abduction to improve functional use of the right upper extremity.  Patient will demonstrate functional  right shoulder internal rotation to T8 to improve functional use of the right upper extremity.   Patient will improve right upper extremity strength to 4+/5 to improve functional use of the right upper extremity.   Patient will demonstrate the ability to place a 5lb weight on/off an overhead shelf with the right upper extremity with proper scapulohumeral rhythm to improve functional use of the right upper extremity.    Plan     Patient will continue to benefit from skilled physical therapy treatment as prescribed working towards goals listed above to maximize functional potential.       Trev Hutton, PT, DPT  12/11/2023

## 2023-12-14 ENCOUNTER — CLINICAL SUPPORT (OUTPATIENT)
Dept: REHABILITATION | Facility: HOSPITAL | Age: 63
End: 2023-12-14
Payer: COMMERCIAL

## 2023-12-14 DIAGNOSIS — S43.51XA SPRAIN OF RIGHT ACROMIOCLAVICULAR JOINT, INITIAL ENCOUNTER: ICD-10-CM

## 2023-12-14 DIAGNOSIS — M75.121 COMPLETE TEAR OF RIGHT ROTATOR CUFF, UNSPECIFIED WHETHER TRAUMATIC: ICD-10-CM

## 2023-12-14 DIAGNOSIS — M75.21 BICIPITAL TENDINITIS, RIGHT: ICD-10-CM

## 2023-12-14 DIAGNOSIS — M25.511 ACUTE PAIN OF RIGHT SHOULDER: Primary | ICD-10-CM

## 2023-12-14 PROCEDURE — 97112 NEUROMUSCULAR REEDUCATION: CPT

## 2023-12-14 PROCEDURE — 97110 THERAPEUTIC EXERCISES: CPT

## 2023-12-14 PROCEDURE — 97140 MANUAL THERAPY 1/> REGIONS: CPT

## 2023-12-14 PROCEDURE — 97010 HOT OR COLD PACKS THERAPY: CPT

## 2023-12-14 PROCEDURE — 97014 ELECTRIC STIMULATION THERAPY: CPT

## 2023-12-14 NOTE — PROGRESS NOTES
[facility]  Physical Therapy Treatment Note    Name: Lisa Roberson  Clinic Number: [id number]    Therapy Diagnosis:   Encounter Diagnoses   Name Primary?    Acute pain of right shoulder Yes    Sprain of right acromioclavicular joint, initial encounter     Bicipital tendinitis, right     Complete tear of right rotator cuff, unspecified whether traumatic      Physician: Hemant Arrieta MD    Visit Date: 12/14/2023    Physician Orders: PT Eval and Treat  Medical Diagnosis from Referral: pain in right shoulder, sprain of right acromioclavicular joint, bicipital tendinitis, and complete tear of right rotator cuff  Evaluation Date: 12/5/2023  Date of Surgery: 11/20/2023  Authorization Period Expiration: 11/29/2024  Plan of Care Expiration: 1/19/2024  Visit # / Visits authorized: 4/13 (including initial evaluation)  PTA Visit #: 0    Time In: 1016  Time Out: 1111  Total Billable Time: 55 minutes    Precautions: Standard and passive and active-assisted range of motion only at this time    Next MD visit: 1/4/2024    Subjective     Pt reports: She took a muscle relaxer last night which helped with the muscular pain in her right upper arm.    She was compliant with home exercise program.  Response to previous treatment: good; no increase in pain    Pain: 3/10  Location: right shoulder      Objective     Bettie received therapeutic exercises to develop strength, endurance, ROM, flexibility, and posture for 16 minutes including:    UBE: x 5 minutes (backwards; left upper extremity guiding motion)  Pulleys: x 5 minutes  Counter slides: x 20 reps with 5 second holds  Supine cane flexion: x 20 reps with 5 second holds     Bettie received the following manual therapy techniques: were applied to the: right shoulder for 12 minutes, including:    Passive range of motion of the right shoulder into flexion, abduction, external rotation, and internal rotation  Joint mobilizations (grades 1-3) of  the glenohumeral joint to improve lubrication and range of motion  Myofascial release to the musculature surrounding the right shoulder, specifically the right upper arm    Bettie participated in neuromuscular re-education activities to improve: Posture for 12 minutes. The following activities were included:    Sitting scapular retractions: x 20 reps with 3 second holds  Supine scapular retraction: x 20 reps with 5 second holds  Right scapular proprioceptive neuromuscular facilitation in left sidelying: 2 x 10 reps  Sidelying external rotation with scapular retraction: x 20 reps (active-assisted range of motion)    Bettie received the following supervised modalities after being cleared for contradictions: premodulated IFC Electrical Stimulation for pain control applied to the right shoulder. Pt received stimulation at and intensity of 9 V and a frequency of  Hz for 15 minutes. Lisa tolderated treatment well without any adverse effects.      Bettie received cold pack for 15 minutes to right shoulder in conjunction with premodulated IFC electrical stimulation.     Right Shoulder AROM Right Shoulder PROM   Flexion N/A degrees 140 degrees   External Rotation at 0 Degrees Abduction N/A degrees 70 degrees   Functional Internal Rotation Not assessed per protocol N/A     Home Exercises Provided and Patient Education Provided     Education provided: Patient educated on the importance of completing skilled physical therapy treatment and home exercise program as prescribed to maximize functional gains.    Written Home Exercises Provided: Patient instructed to cont prior HEP. Exercises were reviewed and Bettie was able to demonstrate them prior to the end of the session.  Bettie demonstrated good  understanding of the education provided.     See EMR under Patient Instructions for exercises provided  during therapy sessions .    Assessment     Patient with good effort throughout treatment. Patient with no reports of  significantly increased pain with stretching/therapeutic exercise/neuromuscular re-education. Premodulated IFC electrical stimulation and ice applied to the right shoulder post treatment to decrease pain. Patient reported a reduction in right shoulder pain post-treatment.    Bettie isprogressing well towards her goals.   Pt prognosis is Good.     Pt will continue to benefit from skilled outpatient physical therapy to address the deficits listed in the problem list box on initial evaluation, provide pt/family education, and to maximize pt's level of independence in the home and community environment.     Pt's spiritual, cultural, and educational needs considered and pt agreeable to plan of care and goals.     Anticipated barriers to physical therapy: compliance with home exercise program; natural course of healing       Goals:    Short Term Goals:  Patient will demonstrate independence with home exercise program to ensure carryover of treatment.  Patient will demonstrate 90 degrees of right shoulder flexion/abduction active range of motion to improve functional use of the right upper extremity.  Patient will demonstrate 35 degrees of right shoulder external rotation at 0 degrees abduction to improve functional use of the right upper extremity.  Patient will demonstrate functional right shoulder internal rotation to L4 to improve functional use of the right upper extremity.   Patient will improve right upper extremity strength to 3+/5 to improve functional use of the right upper extremity.      Long Term Goals:  Patient will demonstrate 140 degrees of right shoulder flexion/abduction active range of motion to improve functional use of the right upper extremity.  Patient will demonstrate 70 degrees of right shoulder external rotation at 0 degrees abduction to improve functional use of the right upper extremity.  Patient will demonstrate functional right shoulder internal rotation to T8 to improve functional use of the  right upper extremity.   Patient will improve right upper extremity strength to 4+/5 to improve functional use of the right upper extremity.   Patient will demonstrate the ability to place a 5lb weight on/off an overhead shelf with the right upper extremity with proper scapulohumeral rhythm to improve functional use of the right upper extremity.    Plan     Patient will continue to benefit from skilled physical therapy treatment as prescribed working towards goals listed above to maximize functional potential.       Trev Hutton, PT, DPT  12/14/2023

## 2023-12-19 ENCOUNTER — CLINICAL SUPPORT (OUTPATIENT)
Dept: REHABILITATION | Facility: HOSPITAL | Age: 63
End: 2023-12-19
Payer: COMMERCIAL

## 2023-12-19 DIAGNOSIS — M75.121 COMPLETE TEAR OF RIGHT ROTATOR CUFF, UNSPECIFIED WHETHER TRAUMATIC: ICD-10-CM

## 2023-12-19 DIAGNOSIS — M25.511 ACUTE PAIN OF RIGHT SHOULDER: Primary | ICD-10-CM

## 2023-12-19 DIAGNOSIS — S43.51XA SPRAIN OF RIGHT ACROMIOCLAVICULAR JOINT, INITIAL ENCOUNTER: ICD-10-CM

## 2023-12-19 DIAGNOSIS — M75.21 BICIPITAL TENDINITIS, RIGHT: ICD-10-CM

## 2023-12-19 PROCEDURE — 97112 NEUROMUSCULAR REEDUCATION: CPT | Mod: CQ

## 2023-12-19 PROCEDURE — 97110 THERAPEUTIC EXERCISES: CPT | Mod: CQ

## 2023-12-19 PROCEDURE — 97010 HOT OR COLD PACKS THERAPY: CPT | Mod: CQ

## 2023-12-19 PROCEDURE — 97014 ELECTRIC STIMULATION THERAPY: CPT | Mod: CQ

## 2023-12-19 PROCEDURE — 97140 MANUAL THERAPY 1/> REGIONS: CPT | Mod: CQ

## 2023-12-19 NOTE — PROGRESS NOTES
[facility]  Physical Therapy Treatment Note    Name: Lisa Roberson  Clinic Number: [id number]    Therapy Diagnosis:   Encounter Diagnoses   Name Primary?    Acute pain of right shoulder Yes    Sprain of right acromioclavicular joint, initial encounter     Bicipital tendinitis, right     Complete tear of right rotator cuff, unspecified whether traumatic      Physician: Hemant Arrieta MD    Visit Date: 12/19/2023    Physician Orders: PT Eval and Treat  Medical Diagnosis from Referral: pain in right shoulder, sprain of right acromioclavicular joint, bicipital tendinitis, and complete tear of right rotator cuff  Evaluation Date: 12/5/2023  Date of Surgery: 11/20/2023  Authorization Period Expiration: 11/29/2024  Plan of Care Expiration: 1/19/2024  Visit # / Visits authorized: 5/13 (including initial evaluation)  PTA Visit #: 1    Time In: 1022  Time Out: 1119  Total Billable Time: 57 minutes    Precautions: Standard and passive and active-assisted range of motion only at this time    Next MD visit: 1/4/2024    Subjective     Pt reports: her shoulder was hurting a lot on Sunday but she feels like she just overused it; patient states she put ice on her shoulder that night and it has been better since then    She was compliant with home exercise program.  Response to previous treatment: good; no increase in pain    Pain: 5/10  Location: right shoulder      Objective     Bettie received therapeutic exercises to develop strength, endurance, ROM, flexibility, and posture for 16 minutes including:    UBE: x 5 minutes (backwards; left upper extremity guiding motion)  Pulleys: x 5 minutes  Counter slides: x 20 reps with 5 second holds  Supine cane flexion: x 20 reps with 5 second holds     Bettie received the following manual therapy techniques: were applied to the: right shoulder for 12 minutes, including:    Passive range of motion of the right shoulder into flexion, abduction,  external rotation, and internal rotation  Joint mobilizations (grades 1-3) of the glenohumeral joint to improve lubrication and range of motion  Myofascial release to the musculature surrounding the right shoulder, specifically the right upper arm    Bettie participated in neuromuscular re-education activities to improve: Posture for 14 minutes. The following activities were included:    Sitting scapular retractions: x 20 reps with 3 second holds  Supine scapular retraction: x 20 reps with 5 second holds  Right scapular proprioceptive neuromuscular facilitation in left sidelying: 2 x 10 reps  Sidelying external rotation with scapular retraction: x 20 reps (active-assisted range of motion)    Bettie received the following supervised modalities after being cleared for contradictions: premodulated IFC Electrical Stimulation for pain control applied to the right shoulder. Pt received stimulation at and intensity of 9 V and a frequency of  Hz for 15 minutes. Lisa tolderated treatment well without any adverse effects.      Bettie received cold pack for 15 minutes to right shoulder in conjunction with premodulated IFC electrical stimulation.     Right Shoulder AROM Right Shoulder PROM   Flexion N/A degrees 140 degrees   External Rotation at 0 Degrees Abduction N/A degrees 70 degrees   Functional Internal Rotation Not assessed per protocol N/A     Home Exercises Provided and Patient Education Provided     Education provided: Patient educated on the importance of completing skilled physical therapy treatment and home exercise program as prescribed to maximize functional gains.    Written Home Exercises Provided: Patient instructed to cont prior HEP. Exercises were reviewed and Bettie was able to demonstrate them prior to the end of the session.  Bettie demonstrated good  understanding of the education provided.     See EMR under Patient Instructions for exercises provided  during therapy sessions .    Assessment      Patient with good effort throughout treatment. Patient arrived to treatment with increased pain due to overuse on Sunday (per patient). Patient able to perform all exercises with no complaints. Patient with good tolerance of manual stretching this session. Premodulated IFC electrical stimulation and ice applied to the right shoulder post treatment to decrease pain. Patient reported her shoulder felt better post treatment.    Bettie isprogressing well towards her goals.   Pt prognosis is Good.     Pt will continue to benefit from skilled outpatient physical therapy to address the deficits listed in the problem list box on initial evaluation, provide pt/family education, and to maximize pt's level of independence in the home and community environment.     Pt's spiritual, cultural, and educational needs considered and pt agreeable to plan of care and goals.     Anticipated barriers to physical therapy: compliance with home exercise program; natural course of healing       Goals:    Short Term Goals:  Patient will demonstrate independence with home exercise program to ensure carryover of treatment.  Patient will demonstrate 90 degrees of right shoulder flexion/abduction active range of motion to improve functional use of the right upper extremity.  Patient will demonstrate 35 degrees of right shoulder external rotation at 0 degrees abduction to improve functional use of the right upper extremity.  Patient will demonstrate functional right shoulder internal rotation to L4 to improve functional use of the right upper extremity.   Patient will improve right upper extremity strength to 3+/5 to improve functional use of the right upper extremity.      Long Term Goals:  Patient will demonstrate 140 degrees of right shoulder flexion/abduction active range of motion to improve functional use of the right upper extremity.  Patient will demonstrate 70 degrees of right shoulder external rotation at 0 degrees abduction to  improve functional use of the right upper extremity.  Patient will demonstrate functional right shoulder internal rotation to T8 to improve functional use of the right upper extremity.   Patient will improve right upper extremity strength to 4+/5 to improve functional use of the right upper extremity.   Patient will demonstrate the ability to place a 5lb weight on/off an overhead shelf with the right upper extremity with proper scapulohumeral rhythm to improve functional use of the right upper extremity.    Plan     Patient will continue to benefit from skilled physical therapy treatment as prescribed working towards goals listed above to maximize functional potential.       RHODA Lomas  12/19/2023

## 2023-12-21 ENCOUNTER — CLINICAL SUPPORT (OUTPATIENT)
Dept: REHABILITATION | Facility: HOSPITAL | Age: 63
End: 2023-12-21
Payer: COMMERCIAL

## 2023-12-21 DIAGNOSIS — M75.121 COMPLETE TEAR OF RIGHT ROTATOR CUFF, UNSPECIFIED WHETHER TRAUMATIC: ICD-10-CM

## 2023-12-21 DIAGNOSIS — M75.21 BICIPITAL TENDINITIS, RIGHT: ICD-10-CM

## 2023-12-21 DIAGNOSIS — S43.51XA SPRAIN OF RIGHT ACROMIOCLAVICULAR JOINT, INITIAL ENCOUNTER: ICD-10-CM

## 2023-12-21 DIAGNOSIS — M25.511 ACUTE PAIN OF RIGHT SHOULDER: Primary | ICD-10-CM

## 2023-12-21 PROCEDURE — 97010 HOT OR COLD PACKS THERAPY: CPT | Mod: CQ

## 2023-12-21 PROCEDURE — 97112 NEUROMUSCULAR REEDUCATION: CPT | Mod: CQ

## 2023-12-21 PROCEDURE — 97110 THERAPEUTIC EXERCISES: CPT | Mod: CQ

## 2023-12-21 PROCEDURE — 97140 MANUAL THERAPY 1/> REGIONS: CPT | Mod: CQ

## 2023-12-21 PROCEDURE — 97014 ELECTRIC STIMULATION THERAPY: CPT | Mod: CQ

## 2023-12-21 NOTE — PROGRESS NOTES
[facility]  Physical Therapy Treatment Note    Name: Lisa Roberson  Clinic Number: [id number]    Therapy Diagnosis:   Encounter Diagnoses   Name Primary?    Acute pain of right shoulder Yes    Sprain of right acromioclavicular joint, initial encounter     Bicipital tendinitis, right     Complete tear of right rotator cuff, unspecified whether traumatic      Physician: Hemant Arrieta MD    Visit Date: 12/21/2023    Physician Orders: PT Eval and Treat  Medical Diagnosis from Referral: pain in right shoulder, sprain of right acromioclavicular joint, bicipital tendinitis, and complete tear of right rotator cuff  Evaluation Date: 12/5/2023  Date of Surgery: 11/20/2023  Authorization Period Expiration: 11/29/2024  Plan of Care Expiration: 1/19/2024  Visit # / Visits authorized: 6/13 (including initial evaluation)  PTA Visit #: 2    Time In: 0932  Time Out: 1029  Total Billable Time: 57 minutes    Precautions: Standard and passive and active-assisted range of motion only at this time    Next MD visit: 1/4/2024    Subjective     Pt reports: her shoulder has been feeling better since last treatment but she is not pain-free this morning    She was compliant with home exercise program.  Response to previous treatment: good; small decrease in pain    Pain: 4/10  Location: right shoulder      Objective     Bettie received therapeutic exercises to develop strength, endurance, ROM, flexibility, and posture for 16 minutes including:    UBE: x 5 minutes (backwards; left upper extremity guiding motion)  Pulleys: x 5 minutes  Counter slides: x 20 reps with 5 second holds  Supine cane flexion: x 20 reps with 5 second holds     Bettie received the following manual therapy techniques: were applied to the: right shoulder for 12 minutes, including:    Passive range of motion of the right shoulder into flexion, abduction, external rotation, and internal rotation  Joint mobilizations (grades 1-3) of  the glenohumeral joint to improve lubrication and range of motion  Myofascial release to the musculature surrounding the right shoulder, specifically the right upper arm    Bettie participated in neuromuscular re-education activities to improve: Posture for 14 minutes. The following activities were included:    Sitting scapular retractions: x 20 reps with 3 second holds  Supine scapular retraction: x 20 reps with 5 second holds  Right scapular proprioceptive neuromuscular facilitation in left sidelying: 2 x 10 reps  Sidelying external rotation with scapular retraction: x 20 reps (active-assisted range of motion)    Bettie received the following supervised modalities after being cleared for contradictions: premodulated IFC Electrical Stimulation for pain control applied to the right shoulder. Pt received stimulation at and intensity of 9 V and a frequency of  Hz for 15 minutes. Lisa tolderated treatment well without any adverse effects.      Bettie received cold pack for 15 minutes to right shoulder in conjunction with premodulated IFC electrical stimulation.     Right Shoulder AROM Right Shoulder PROM   Flexion N/A degrees 140 degrees   External Rotation at 0 Degrees Abduction N/A degrees 70 degrees   Functional Internal Rotation Not assessed per protocol N/A     Home Exercises Provided and Patient Education Provided     Education provided: Patient educated on the importance of completing skilled physical therapy treatment and home exercise program as prescribed to maximize functional gains.    Written Home Exercises Provided: Patient instructed to cont prior HEP. Exercises were reviewed and Bettie was able to demonstrate them prior to the end of the session.  Bettie demonstrated good  understanding of the education provided.     See EMR under Patient Instructions for exercises provided  during therapy sessions .    Assessment     Patient with good effort throughout treatment. Patient arrived to treatment with  slightly decreased pain since last visit. Patient able to perform all exercises with no complaints of increase in pain. Patient with good tolerance of manual stretching with shoulder flexion being most bothersome. Premodulated IFC electrical stimulation and ice applied to the right shoulder post treatment to decrease pain. Patient reported her shoulder felt better post treatment.    Bettie isprogressing well towards her goals.   Pt prognosis is Good.     Pt will continue to benefit from skilled outpatient physical therapy to address the deficits listed in the problem list box on initial evaluation, provide pt/family education, and to maximize pt's level of independence in the home and community environment.     Pt's spiritual, cultural, and educational needs considered and pt agreeable to plan of care and goals.     Anticipated barriers to physical therapy: compliance with home exercise program; natural course of healing       Goals:    Short Term Goals:  Patient will demonstrate independence with home exercise program to ensure carryover of treatment.  Patient will demonstrate 90 degrees of right shoulder flexion/abduction active range of motion to improve functional use of the right upper extremity.  Patient will demonstrate 35 degrees of right shoulder external rotation at 0 degrees abduction to improve functional use of the right upper extremity.  Patient will demonstrate functional right shoulder internal rotation to L4 to improve functional use of the right upper extremity.   Patient will improve right upper extremity strength to 3+/5 to improve functional use of the right upper extremity.      Long Term Goals:  Patient will demonstrate 140 degrees of right shoulder flexion/abduction active range of motion to improve functional use of the right upper extremity.  Patient will demonstrate 70 degrees of right shoulder external rotation at 0 degrees abduction to improve functional use of the right upper  extremity.  Patient will demonstrate functional right shoulder internal rotation to T8 to improve functional use of the right upper extremity.   Patient will improve right upper extremity strength to 4+/5 to improve functional use of the right upper extremity.   Patient will demonstrate the ability to place a 5lb weight on/off an overhead shelf with the right upper extremity with proper scapulohumeral rhythm to improve functional use of the right upper extremity.    Plan     Patient will continue to benefit from skilled physical therapy treatment as prescribed working towards goals listed above to maximize functional potential.       RHODA Lomas  12/21/2023

## 2023-12-26 ENCOUNTER — CLINICAL SUPPORT (OUTPATIENT)
Dept: REHABILITATION | Facility: HOSPITAL | Age: 63
End: 2023-12-26
Payer: COMMERCIAL

## 2023-12-26 DIAGNOSIS — M75.21 BICIPITAL TENDINITIS, RIGHT: ICD-10-CM

## 2023-12-26 DIAGNOSIS — M25.511 ACUTE PAIN OF RIGHT SHOULDER: Primary | ICD-10-CM

## 2023-12-26 DIAGNOSIS — M75.121 COMPLETE TEAR OF RIGHT ROTATOR CUFF, UNSPECIFIED WHETHER TRAUMATIC: ICD-10-CM

## 2023-12-26 DIAGNOSIS — S43.51XA SPRAIN OF RIGHT ACROMIOCLAVICULAR JOINT, INITIAL ENCOUNTER: ICD-10-CM

## 2023-12-26 PROCEDURE — 97112 NEUROMUSCULAR REEDUCATION: CPT | Mod: CQ

## 2023-12-26 PROCEDURE — 97110 THERAPEUTIC EXERCISES: CPT | Mod: CQ

## 2023-12-26 PROCEDURE — 97140 MANUAL THERAPY 1/> REGIONS: CPT | Mod: CQ

## 2023-12-26 NOTE — PROGRESS NOTES
[facility]  Physical Therapy Treatment Note    Name: Lisa Roberson  Clinic Number: [id number]    Therapy Diagnosis:   Encounter Diagnoses   Name Primary?    Acute pain of right shoulder Yes    Sprain of right acromioclavicular joint, initial encounter     Bicipital tendinitis, right     Complete tear of right rotator cuff, unspecified whether traumatic      Physician: Hemant Arrieta MD    Visit Date: 12/26/2023    Physician Orders: PT Eval and Treat  Medical Diagnosis from Referral: pain in right shoulder, sprain of right acromioclavicular joint, bicipital tendinitis, and complete tear of right rotator cuff  Evaluation Date: 12/5/2023  Date of Surgery: 11/20/2023  Authorization Period Expiration: 11/29/2024  Plan of Care Expiration: 1/19/2024  Visit # / Visits authorized: 7/13 (including initial evaluation)  PTA Visit #: 3    Time In: 1104  Time Out: 1157  Total Billable Time: 53 minutes (12 minutes apart of Concurrent treatment)    Precautions: Standard and passive and active-assisted range of motion only at this time    Next MD visit: 1/4/2024    Subjective     Pt reports: her shoulder has been slightly better but she is still having pain throughout the day    She was compliant with home exercise program.  Response to previous treatment: good; small decrease in pain    Pain: 3/10  Location: right shoulder      Objective     Bettie received therapeutic exercises to develop strength, endurance, ROM, flexibility, and posture for 17 minutes including:    UBE: x 5 minutes (backwards; left upper extremity guiding motion)  Pulleys: x 5 minutes  Counter slides: x 15 reps with 5 second holds  Supine cane flexion: x 20 reps with 5 second holds   Wall cane flexion: x 15 reps    Bettie received the following manual therapy techniques: were applied to the: right shoulder for 12 minutes, including:    Passive range of motion of the right shoulder into flexion, abduction, external  rotation, and internal rotation  Joint mobilizations (grades 1-3) of the glenohumeral joint to improve lubrication and range of motion  Myofascial release to the musculature surrounding the right shoulder, specifically the right upper arm    Bettie participated in neuromuscular re-education activities to improve: Posture for 14 minutes. The following activities were included:    Sitting scapular retractions: x 20 reps with 3 second holds  Supine scapular retraction: x 20 reps with 5 second holds  Right scapular proprioceptive neuromuscular facilitation in left sidelying: 2 x 10 reps  Sidelying external rotation with scapular retraction: x 20 reps (active-assisted range of motion)    Bettie received the following supervised modalities after being cleared for contradictions: premodulated IFC Electrical Stimulation for pain control applied to the right shoulder. Pt received stimulation at and intensity of 13 V and a frequency of  Hz for 10 minutes. Lisa tolderated treatment well without any adverse effects.      Bettie received cold pack for 10 minutes to right shoulder in conjunction with premodulated IFC electrical stimulation.     Right Shoulder AROM Right Shoulder PROM   Flexion N/A degrees 140 degrees   External Rotation at 0 Degrees Abduction N/A degrees 70 degrees   Functional Internal Rotation Not assessed per protocol N/A     Home Exercises Provided and Patient Education Provided     Education provided: Patient educated on the importance of completing skilled physical therapy treatment and home exercise program as prescribed to maximize functional gains.    Written Home Exercises Provided: Patient instructed to cont prior HEP. Exercises were reviewed and Bettie was able to demonstrate them prior to the end of the session.  Bettie demonstrated good  understanding of the education provided.     See EMR under Patient Instructions for exercises provided  during therapy sessions .    Assessment     Patient  with good effort throughout treatment. Patient arrived to treatment with decreased pain since last visit, but still present. 12 minutes of patient's session was apart of concurrent treatment. Patient able to perform all exercises with no complaints. Patient did have increased pain with added wall cane flexion this visit. Patient with good tolerance of manual stretching to right shoulder in all directions. Premodulated IFC electrical stimulation and ice applied to the right shoulder post treatment to decrease pain, patient requested to stop after 10 minutes of modalities today. Patient reported her shoulder felt better post treatment.    Bettie isprogressing well towards her goals.   Pt prognosis is Good.     Pt will continue to benefit from skilled outpatient physical therapy to address the deficits listed in the problem list box on initial evaluation, provide pt/family education, and to maximize pt's level of independence in the home and community environment.     Pt's spiritual, cultural, and educational needs considered and pt agreeable to plan of care and goals.     Anticipated barriers to physical therapy: compliance with home exercise program; natural course of healing       Goals:    Short Term Goals:  Patient will demonstrate independence with home exercise program to ensure carryover of treatment.  Patient will demonstrate 90 degrees of right shoulder flexion/abduction active range of motion to improve functional use of the right upper extremity.  Patient will demonstrate 35 degrees of right shoulder external rotation at 0 degrees abduction to improve functional use of the right upper extremity.  Patient will demonstrate functional right shoulder internal rotation to L4 to improve functional use of the right upper extremity.   Patient will improve right upper extremity strength to 3+/5 to improve functional use of the right upper extremity.      Long Term Goals:  Patient will demonstrate 140 degrees of right  shoulder flexion/abduction active range of motion to improve functional use of the right upper extremity.  Patient will demonstrate 70 degrees of right shoulder external rotation at 0 degrees abduction to improve functional use of the right upper extremity.  Patient will demonstrate functional right shoulder internal rotation to T8 to improve functional use of the right upper extremity.   Patient will improve right upper extremity strength to 4+/5 to improve functional use of the right upper extremity.   Patient will demonstrate the ability to place a 5lb weight on/off an overhead shelf with the right upper extremity with proper scapulohumeral rhythm to improve functional use of the right upper extremity.    Plan     Patient will continue to benefit from skilled physical therapy treatment as prescribed working towards goals listed above to maximize functional potential.       RHODA Lomas  12/26/2023

## 2024-01-03 ENCOUNTER — CLINICAL SUPPORT (OUTPATIENT)
Dept: REHABILITATION | Facility: HOSPITAL | Age: 64
End: 2024-01-03
Payer: COMMERCIAL

## 2024-01-03 DIAGNOSIS — M25.511 ACUTE PAIN OF RIGHT SHOULDER: Primary | ICD-10-CM

## 2024-01-03 DIAGNOSIS — S43.51XA SPRAIN OF RIGHT ACROMIOCLAVICULAR JOINT, INITIAL ENCOUNTER: ICD-10-CM

## 2024-01-03 DIAGNOSIS — M75.121 COMPLETE TEAR OF RIGHT ROTATOR CUFF, UNSPECIFIED WHETHER TRAUMATIC: ICD-10-CM

## 2024-01-03 DIAGNOSIS — M75.21 BICIPITAL TENDINITIS, RIGHT: ICD-10-CM

## 2024-01-03 PROCEDURE — 97110 THERAPEUTIC EXERCISES: CPT | Mod: CQ

## 2024-01-03 PROCEDURE — 97140 MANUAL THERAPY 1/> REGIONS: CPT | Mod: CQ

## 2024-01-03 NOTE — PROGRESS NOTES
[facility]  Physical Therapy Treatment Note    Name: Lisa Roberson  Clinic Number: [id number]    Therapy Diagnosis:   Encounter Diagnoses   Name Primary?    Acute pain of right shoulder Yes    Sprain of right acromioclavicular joint, initial encounter     Bicipital tendinitis, right     Complete tear of right rotator cuff, unspecified whether traumatic      Physician: Hemant Arrieta MD    Visit Date: 1/3/2024    Physician Orders: PT Eval and Treat  Medical Diagnosis from Referral: pain in right shoulder, sprain of right acromioclavicular joint, bicipital tendinitis, and complete tear of right rotator cuff  Evaluation Date: 12/5/2023  Date of Surgery: 11/20/2023  Authorization Period Expiration: 11/29/2024  Plan of Care Expiration: 1/19/2024  Visit # / Visits authorized: 8/13 (including initial evaluation)  PTA Visit #: 4    Time In: 1111 (Patient 11 minutes late for appointment time)  Time Out: 1141  Total Billable Time: 30 minutes    Precautions: Standard and passive and active-assisted range of motion only at this time    Next MD visit: 1/4/2024    Subjective     Pt reports: she has had an increase in pain since last session from stretching; 3/10 to 5/10 according to pain scale from last session    She was compliant with home exercise program.    Pain: 5/10  Location: right shoulder      Objective     Bettie received therapeutic exercises to develop strength, endurance, ROM, flexibility, and posture for 10 minutes including:    UBE: x 4 minutes (backwards; left upper extremity guiding motion)  Pulleys: x 4 minutes  Supine cane flexion: x 20 reps with 5 second holds     Counter slides: x 15 reps with 5 second holds (not performed today- time constraint)  Wall cane flexion: x 15 reps (not performed today- time constraint)    Bettie received the following manual therapy techniques: were applied to the: right shoulder for 10 minutes, including:    Passive range of motion of  the right shoulder into flexion, abduction, external rotation, and internal rotation  Joint mobilizations (grades 1-3) of the glenohumeral joint to improve lubrication and range of motion  Myofascial release to the musculature surrounding the right shoulder, specifically the right upper arm    Bettie participated in neuromuscular re-education activities to improve: Posture for 10 minutes. The following activities were included:    Supine scapular retraction: x 20 reps with 5 second holds  Right scapular proprioceptive neuromuscular facilitation in left sidelying: 2 x 10 reps  Sidelying external rotation with scapular retraction: x 20 reps (active-assisted range of motion)    Sitting scapular retractions: x 20 reps with 3 second holds (not performed- time constraint)    Bettie received the following supervised modalities after being cleared for contradictions: premodulated IFC Electrical Stimulation for pain control applied to the right shoulder. Pt received stimulation at and intensity of 13 V and a frequency of  Hz for 0 minutes. Lisa tolderated treatment well without any adverse effects.  (Not performed per patient request)    Bettie received cold pack for 0 minutes to right shoulder in conjunction with premodulated IFC electrical stimulation. (Not performed per patient request)     Right Shoulder AROM Right Shoulder PROM   Flexion N/A degrees 140 degrees   External Rotation at 0 Degrees Abduction N/A degrees 70 degrees   Functional Internal Rotation Not assessed per protocol N/A     Home Exercises Provided and Patient Education Provided     Education provided: Patient educated on the importance of completing skilled physical therapy treatment and home exercise program as prescribed to maximize functional gains.    Written Home Exercises Provided: Patient instructed to cont prior HEP. Exercises were reviewed and Bettie was able to demonstrate them prior to the end of the session.  Bettie demonstrated good   understanding of the education provided.     See EMR under Patient Instructions for exercises provided  during therapy sessions .    Assessment     Patient with good effort throughout treatment. Patient arrived to treatment with increased pain from last session, but did state she picked up a case of water that irritated it. Patient able to perform all exercises with no complaints, with some exercises being held due to time constraints (patient 11 minutes late and needed to leave early due to another appointment at 1 pm). Patient with good tolerance of manual stretching to right shoulder in all directions. Patient reported her shoulder felt better post treatment.    Bettie isprogressing well towards her goals.   Pt prognosis is Good.     Pt will continue to benefit from skilled outpatient physical therapy to address the deficits listed in the problem list box on initial evaluation, provide pt/family education, and to maximize pt's level of independence in the home and community environment.     Pt's spiritual, cultural, and educational needs considered and pt agreeable to plan of care and goals.     Anticipated barriers to physical therapy: compliance with home exercise program; natural course of healing       Goals:    Short Term Goals:  Patient will demonstrate independence with home exercise program to ensure carryover of treatment.  Patient will demonstrate 90 degrees of right shoulder flexion/abduction active range of motion to improve functional use of the right upper extremity.  Patient will demonstrate 35 degrees of right shoulder external rotation at 0 degrees abduction to improve functional use of the right upper extremity.  Patient will demonstrate functional right shoulder internal rotation to L4 to improve functional use of the right upper extremity.   Patient will improve right upper extremity strength to 3+/5 to improve functional use of the right upper extremity.      Long Term Goals:  Patient will  demonstrate 140 degrees of right shoulder flexion/abduction active range of motion to improve functional use of the right upper extremity.  Patient will demonstrate 70 degrees of right shoulder external rotation at 0 degrees abduction to improve functional use of the right upper extremity.  Patient will demonstrate functional right shoulder internal rotation to T8 to improve functional use of the right upper extremity.   Patient will improve right upper extremity strength to 4+/5 to improve functional use of the right upper extremity.   Patient will demonstrate the ability to place a 5lb weight on/off an overhead shelf with the right upper extremity with proper scapulohumeral rhythm to improve functional use of the right upper extremity.    Plan     Patient will continue to benefit from skilled physical therapy treatment as prescribed working towards goals listed above to maximize functional potential.       RHODA Lomas  1/3/2024

## 2024-01-05 ENCOUNTER — CLINICAL SUPPORT (OUTPATIENT)
Dept: REHABILITATION | Facility: HOSPITAL | Age: 64
End: 2024-01-05
Payer: COMMERCIAL

## 2024-01-05 DIAGNOSIS — M75.21 BICIPITAL TENDINITIS, RIGHT: ICD-10-CM

## 2024-01-05 DIAGNOSIS — S43.51XA SPRAIN OF RIGHT ACROMIOCLAVICULAR JOINT, INITIAL ENCOUNTER: ICD-10-CM

## 2024-01-05 DIAGNOSIS — M25.511 ACUTE PAIN OF RIGHT SHOULDER: Primary | ICD-10-CM

## 2024-01-05 DIAGNOSIS — M75.121 COMPLETE TEAR OF RIGHT ROTATOR CUFF, UNSPECIFIED WHETHER TRAUMATIC: ICD-10-CM

## 2024-01-05 PROCEDURE — 97110 THERAPEUTIC EXERCISES: CPT | Mod: CQ

## 2024-01-05 PROCEDURE — 97140 MANUAL THERAPY 1/> REGIONS: CPT | Mod: CQ

## 2024-01-05 PROCEDURE — 97032 APPL MODALITY 1+ESTIM EA 15: CPT | Mod: CQ

## 2024-01-05 NOTE — PROGRESS NOTES
"[facility]  Physical Therapy Treatment Note    Name: Lisa Roberson  Clinic Number: [id number]    Therapy Diagnosis:   No diagnosis found.    Physician: Hemant Arrieta MD    Visit Date: 1/5/2024    Physician Orders: PT Eval and Treat  Medical Diagnosis from Referral: pain in right shoulder, sprain of right acromioclavicular joint, bicipital tendinitis, and complete tear of right rotator cuff  Evaluation Date: 12/5/2023  Date of Surgery: 11/20/2023  Authorization Period Expiration: 11/29/2024  Plan of Care Expiration: 1/19/2024  Visit # / Visits authorized: 9/13 (including initial evaluation)  PTA Visit #: 5    Time In: 1413 (Patient 13 minutes late for appointment time)  Time Out: 1501  Total Billable Time: 48 minutes    Precautions: Standard and passive and active-assisted range of motion only at this time    Next MD visit: 1/4/2024    Subjective     Pt reports: Her pain is "about a 3 or 4" today.     She was compliant with home exercise program.    Pain: 4/10  Location: right shoulder      Objective     Bettie received therapeutic exercises to develop strength, endurance, ROM, flexibility, and posture for 16 minutes including:  UBE: x 5 minutes (backwards; left upper extremity guiding motion)  Pulleys: x 5 minutes  Supine cane flexion: x 20 reps with 5 second holds   Counter slides: x 15 reps with 5 second holds (not performed)   Wall cane flexion: x 15 reps     Bettie received the following manual therapy techniques: were applied to the: right shoulder for 12 minutes, including:  Passive range of motion of the right shoulder into flexion, abduction, external rotation, and internal rotation  Joint mobilizations (grades 1-3) of the glenohumeral joint to improve lubrication and range of motion  Myofascial release to the musculature surrounding the right shoulder, specifically the right upper arm    Bettie participated in neuromuscular re-education activities to improve: " Posture for 10 minutes. The following activities were included:  Supine scapular retraction: x 20 reps with 5 second holds  Right scapular proprioceptive neuromuscular facilitation in left sidelying: 2 x 10 reps  Sidelying external rotation with scapular retraction: x 20 reps (active-assisted range of motion)  Sitting scapular retractions: x 20 reps with 3 second holds     Bettie received the following supervised modalities after being cleared for contradictions: premodulated IFC Electrical Stimulation for pain control applied to the right shoulder. Pt received stimulation at and intensity of 13 V and a frequency of  Hz for 10 minutes. Lisa tolderated treatment well without any adverse effects.      Bettie received cold pack for 10 minutes to right shoulder in conjunction with premodulated IFC electrical stimulation.      Right Shoulder AROM Right Shoulder PROM   Flexion N/A degrees 140 degrees   External Rotation at 0 Degrees Abduction N/A degrees 70 degrees   Functional Internal Rotation Not assessed per protocol N/A     Home Exercises Provided and Patient Education Provided     Education provided: Patient educated on the importance of completing skilled physical therapy treatment and home exercise program as prescribed to maximize functional gains.    Written Home Exercises Provided: Patient instructed to cont prior HEP. Exercises were reviewed and Bettie was able to demonstrate them prior to the end of the session.  Bettie demonstrated good  understanding of the education provided.     See EMR under Patient Instructions for exercises provided  during therapy sessions .    Assessment     Patient with good effort throughout treatment and able to perform all exercises with no complaints. Electrical stimulation along with ice was applied following therapy session for pain control. Patient with good tolerance of manual stretching to right shoulder in all directions. Patient reported her shoulder felt good with  no new complaints to follow therapy.     Bettie isprogressing well towards her goals.   Pt prognosis is Good.     Pt will continue to benefit from skilled outpatient physical therapy to address the deficits listed in the problem list box on initial evaluation, provide pt/family education, and to maximize pt's level of independence in the home and community environment.     Pt's spiritual, cultural, and educational needs considered and pt agreeable to plan of care and goals.     Anticipated barriers to physical therapy: compliance with home exercise program; natural course of healing       Goals:    Short Term Goals:  Patient will demonstrate independence with home exercise program to ensure carryover of treatment.  Patient will demonstrate 90 degrees of right shoulder flexion/abduction active range of motion to improve functional use of the right upper extremity.  Patient will demonstrate 35 degrees of right shoulder external rotation at 0 degrees abduction to improve functional use of the right upper extremity.  Patient will demonstrate functional right shoulder internal rotation to L4 to improve functional use of the right upper extremity.   Patient will improve right upper extremity strength to 3+/5 to improve functional use of the right upper extremity.      Long Term Goals:  Patient will demonstrate 140 degrees of right shoulder flexion/abduction active range of motion to improve functional use of the right upper extremity.  Patient will demonstrate 70 degrees of right shoulder external rotation at 0 degrees abduction to improve functional use of the right upper extremity.  Patient will demonstrate functional right shoulder internal rotation to T8 to improve functional use of the right upper extremity.   Patient will improve right upper extremity strength to 4+/5 to improve functional use of the right upper extremity.   Patient will demonstrate the ability to place a 5lb weight on/off an overhead shelf with the  right upper extremity with proper scapulohumeral rhythm to improve functional use of the right upper extremity.    Plan     Patient will continue to benefit from skilled physical therapy treatment as prescribed working towards goals listed above to maximize functional potential.       RHODA Vargas  1/5/2024

## 2024-01-09 ENCOUNTER — CLINICAL SUPPORT (OUTPATIENT)
Dept: REHABILITATION | Facility: HOSPITAL | Age: 64
End: 2024-01-09
Payer: COMMERCIAL

## 2024-01-09 DIAGNOSIS — M75.121 COMPLETE TEAR OF RIGHT ROTATOR CUFF, UNSPECIFIED WHETHER TRAUMATIC: ICD-10-CM

## 2024-01-09 DIAGNOSIS — M75.21 BICIPITAL TENDINITIS, RIGHT: ICD-10-CM

## 2024-01-09 DIAGNOSIS — S43.51XA SPRAIN OF RIGHT ACROMIOCLAVICULAR JOINT, INITIAL ENCOUNTER: ICD-10-CM

## 2024-01-09 DIAGNOSIS — M25.511 ACUTE PAIN OF RIGHT SHOULDER: Primary | ICD-10-CM

## 2024-01-09 PROCEDURE — 97110 THERAPEUTIC EXERCISES: CPT

## 2024-01-09 PROCEDURE — 97112 NEUROMUSCULAR REEDUCATION: CPT

## 2024-01-09 PROCEDURE — 97140 MANUAL THERAPY 1/> REGIONS: CPT

## 2024-01-09 NOTE — PROGRESS NOTES
"[facility]  Physical Therapy Treatment Note    Name: Lisa Roberson  Clinic Number: [id number]    Therapy Diagnosis:   No diagnosis found.    Physician: Hemant Arrieta MD    Visit Date: 1/9/2024    Physician Orders: PT Eval and Treat  Medical Diagnosis from Referral: pain in right shoulder, sprain of right acromioclavicular joint, bicipital tendinitis, and complete tear of right rotator cuff  Evaluation Date: 12/5/2023  Date of Surgery: 11/20/2023  Authorization Period Expiration: 11/29/2024  Plan of Care Expiration: 1/19/2024  Visit # / Visits authorized: 10/13 (including initial evaluation)  PTA Visit #: 0    Time In: 1023  Time Out: 1104  Total Billable Time: 41 minutes    Precautions: Standard    Next MD visit: 1/4/2024    Subjective     Pt reports: shoulder is "feeling good today" She said her doctor was pleased and told her to "continue strengthening and finish 6 more weeks of therapy"     She was compliant with home exercise program.    Pain: 4/10  Location: right shoulder      Objective     Bettie received therapeutic exercises to develop strength, endurance, ROM, flexibility, and posture for 14 minutes including:  UBE: x 5 minutes (backwards; left upper extremity guiding motion)  Pulleys: x 5 minutes  Supine cane flexion: with 3 lb cane x 20 reps with 5 second holds   Wall cane flexion: x 15 reps (not this visit)      Bettie received the following manual therapy techniques: were applied to the: right shoulder for 8 minutes, including:  Passive range of motion of the right shoulder into flexion, abduction, external rotation, and internal rotation      Bettie participated in neuromuscular re-education activities to improve: Posture for 19 minutes. The following activities were included:  Supine scapular retraction: x 20 reps with 5 second holds (not this visit)   Right scapular proprioceptive neuromuscular facilitation in left sidelying: 2 x 10 reps ( not this " "visit)  Sidelying external rotation with scapular retraction: x 20 reps  with 1 lb weight and tactile cues for quality of movement  Sitting scapular retractions: x 30 reps with 3 second holds   Theraband rows with red band x 3 x 10  Theraband bilateral shoulder ER with red band x 2 x 10 with verbal cues for scap stability       Bettie received the following supervised modalities after being cleared for contradictions: premodulated IFC Electrical Stimulation for pain control applied to the right shoulder. Pt received stimulation at and intensity of 13 V and a frequency of  Hz for 0 minutes. Lisa tolderated treatment well without any adverse effects.      Bettie received cold pack for 0 minutes to right shoulder in conjunction with premodulated IFC electrical stimulation.      Right Shoulder AROM Right Shoulder PROM   Flexion 134 degrees 140 degrees   External Rotation at 0 Degrees Abduction 85 degrees 75 degrees in scaption   Functional Internal Rotation T12 N/A     Home Exercises Provided and Patient Education Provided     Education provided: Patient educated on the importance of completing skilled physical therapy treatment and home exercise program as prescribed to maximize functional gains.    Written Home Exercises Provided: yes. Exercises were reviewed and Bettie was able to demonstrate them prior to the end of the session.  Bettie demonstrated good  understanding of the education provided.     See EMR under Patient Instructions for exercises provided  during therapy sessions .  HEP pictured below          Assessment     Patient with good effort throughout treatment and able to perform all exercises with no pain.  I added gentle strengthening activities to our session as well as updated her HEP to include those activities due to New MD order states "Progressive strengthening" Overall she is progressing very well and Active Range of Motion was excellent today.     Bettie isprogressing well towards her " goals.   Pt prognosis is Good.     Pt will continue to benefit from skilled outpatient physical therapy to address the deficits listed in the problem list box on initial evaluation, provide pt/family education, and to maximize pt's level of independence in the home and community environment.     Pt's spiritual, cultural, and educational needs considered and pt agreeable to plan of care and goals.     Anticipated barriers to physical therapy: compliance with home exercise program; natural course of healing       Goals:    Short Term Goals:  Patient will demonstrate independence with home exercise program to ensure carryover of treatment.  Patient will demonstrate 90 degrees of right shoulder flexion/abduction active range of motion to improve functional use of the right upper extremity. - met  Patient will demonstrate 35 degrees of right shoulder external rotation at 0 degrees abduction to improve functional use of the right upper extremity. - met  Patient will demonstrate functional right shoulder internal rotation to L4 to improve functional use of the right upper extremity.  - met  Patient will improve right upper extremity strength to 3+/5 to improve functional use of the right upper extremity.      Long Term Goals:  Patient will demonstrate 140 degrees of right shoulder flexion/abduction active range of motion to improve functional use of the right upper extremity.  Patient will demonstrate 70 degrees of right shoulder external rotation at 0 degrees abduction to improve functional use of the right upper extremity.- Met  Patient will demonstrate functional right shoulder internal rotation to T8 to improve functional use of the right upper extremity.   Patient will improve right upper extremity strength to 4+/5 to improve functional use of the right upper extremity.   Patient will demonstrate the ability to place a 5lb weight on/off an overhead shelf with the right upper extremity with proper scapulohumeral rhythm  to improve functional use of the right upper extremity.    Plan     Patient will continue to benefit from skilled physical therapy treatment as prescribed working towards goals listed above and will update activities as appropriate      MANINDER MANCUSO, PT, ATP  1/9/2024

## 2024-01-11 ENCOUNTER — CLINICAL SUPPORT (OUTPATIENT)
Dept: REHABILITATION | Facility: HOSPITAL | Age: 64
End: 2024-01-11
Payer: COMMERCIAL

## 2024-01-11 DIAGNOSIS — S43.51XA SPRAIN OF RIGHT ACROMIOCLAVICULAR JOINT, INITIAL ENCOUNTER: ICD-10-CM

## 2024-01-11 DIAGNOSIS — M75.121 COMPLETE TEAR OF RIGHT ROTATOR CUFF, UNSPECIFIED WHETHER TRAUMATIC: ICD-10-CM

## 2024-01-11 DIAGNOSIS — M25.511 ACUTE PAIN OF RIGHT SHOULDER: Primary | ICD-10-CM

## 2024-01-11 DIAGNOSIS — M75.21 BICIPITAL TENDINITIS, RIGHT: ICD-10-CM

## 2024-01-11 PROCEDURE — 97110 THERAPEUTIC EXERCISES: CPT | Mod: CQ

## 2024-01-11 PROCEDURE — 97112 NEUROMUSCULAR REEDUCATION: CPT | Mod: CQ

## 2024-01-11 PROCEDURE — 97140 MANUAL THERAPY 1/> REGIONS: CPT | Mod: CQ

## 2024-01-11 NOTE — PROGRESS NOTES
[facility]  Physical Therapy Treatment Note    Name: Lisa Roberson  Clinic Number: [id number]    Therapy Diagnosis:   Encounter Diagnoses   Name Primary?    Acute pain of right shoulder Yes    Sprain of right acromioclavicular joint, initial encounter     Bicipital tendinitis, right     Complete tear of right rotator cuff, unspecified whether traumatic        Physician: Hemant Arrieta MD    Visit Date: 1/11/2024    Physician Orders: PT Eval and Treat  Medical Diagnosis from Referral: pain in right shoulder, sprain of right acromioclavicular joint, bicipital tendinitis, and complete tear of right rotator cuff  Evaluation Date: 12/5/2023  Date of Surgery: 11/20/2023  Authorization Period Expiration: 11/29/2024  Plan of Care Expiration: 1/19/2024  Visit # / Visits authorized: 11/13 (including initial evaluation)  PTA Visit #: 1    Time In: 1018  Time Out: 1101  Total Billable Time: 43 minutes    Precautions: Standard    Next MD visit: 1/4/2024    Subjective     Pt reports: her shoulder is sore from added exercises to home exercises program last session, but her shoulder is doing okay otherwise    She was compliant with home exercise program.    Pain: 3/10  Location: right shoulder      Objective     Bettie received therapeutic exercises to develop strength, endurance, ROM, flexibility, and posture for 15 minutes including:    UBE: x 5 minutes (backwards)  Pulleys: x 5 minutes  Supine cane flexion: with 3 lb cane x 20 reps with 5 second holds   Wall cane flexion: x 15 reps    Bettie received the following manual therapy techniques: were applied to the: right shoulder for 8 minutes, including:    Passive range of motion of the right shoulder into flexion, abduction, external rotation, and internal rotation    Bettie participated in neuromuscular re-education activities to improve: Posture for 20 minutes. The following activities were included:    Supine scapular retraction: x 20  reps with 3 second holds  Right scapular proprioceptive neuromuscular facilitation in left sidelying: 2 x 10 reps  Sidelying external rotation with scapular retraction: x 20 reps  with 1 lb weight and tactile cues for quality of movement  Theraband shoulder rows with red band: x 20 reps  Shoulder extensions with scapular retraction: red theraband; x 20 reps  Theraband bilateral shoulder ER with red band x 2 x 10 with verbal cues for scap stability    Sitting scapular retractions: x 30 reps with 3 second holds (not performed)    Bettie received the following supervised modalities after being cleared for contradictions: premodulated IFC Electrical Stimulation for pain control applied to the right shoulder. Pt received stimulation at and intensity of 13 V and a frequency of  Hz for 0 minutes. Lisa tolderated treatment well without any adverse effects.      Bettie received cold pack for 0 minutes to right shoulder in conjunction with premodulated IFC electrical stimulation.      Right Shoulder AROM Right Shoulder PROM   Flexion 134 degrees 140 degrees   External Rotation at 0 Degrees Abduction 85 degrees 75 degrees in scaption   Functional Internal Rotation T12 N/A     Home Exercises Provided and Patient Education Provided     Education provided: Patient educated on the importance of completing skilled physical therapy treatment and home exercise program as prescribed to maximize functional gains.    Written Home Exercises Provided: yes. Exercises were reviewed and Bettie was able to demonstrate them prior to the end of the session.  Bettie demonstrated good  understanding of the education provided.     See EMR under Patient Instructions for exercises provided  during therapy sessions .      Assessment     Patient with good effort throughout treatment. Patient able to perform all exercises with no complaints of increase in pain. Patient with good tolerance of added exercises last session. Patient encouraged to  continue her home exercises program. Patient with no new complaints post treatment.    Bettie isprogressing well towards her goals.   Pt prognosis is Good.     Pt will continue to benefit from skilled outpatient physical therapy to address the deficits listed in the problem list box on initial evaluation, provide pt/family education, and to maximize pt's level of independence in the home and community environment.     Pt's spiritual, cultural, and educational needs considered and pt agreeable to plan of care and goals.     Anticipated barriers to physical therapy: compliance with home exercise program; natural course of healing       Goals:    Short Term Goals:  Patient will demonstrate independence with home exercise program to ensure carryover of treatment.  Patient will demonstrate 90 degrees of right shoulder flexion/abduction active range of motion to improve functional use of the right upper extremity. - met  Patient will demonstrate 35 degrees of right shoulder external rotation at 0 degrees abduction to improve functional use of the right upper extremity. - met  Patient will demonstrate functional right shoulder internal rotation to L4 to improve functional use of the right upper extremity.  - met  Patient will improve right upper extremity strength to 3+/5 to improve functional use of the right upper extremity.      Long Term Goals:  Patient will demonstrate 140 degrees of right shoulder flexion/abduction active range of motion to improve functional use of the right upper extremity.  Patient will demonstrate 70 degrees of right shoulder external rotation at 0 degrees abduction to improve functional use of the right upper extremity.- Met  Patient will demonstrate functional right shoulder internal rotation to T8 to improve functional use of the right upper extremity.   Patient will improve right upper extremity strength to 4+/5 to improve functional use of the right upper extremity.   Patient will demonstrate  the ability to place a 5lb weight on/off an overhead shelf with the right upper extremity with proper scapulohumeral rhythm to improve functional use of the right upper extremity.    Plan     Patient will continue to benefit from skilled physical therapy treatment as prescribed working towards goals listed above and will update activities as appropriate      Matthew Miller PTA  1/11/2024

## 2024-01-18 ENCOUNTER — CLINICAL SUPPORT (OUTPATIENT)
Dept: REHABILITATION | Facility: HOSPITAL | Age: 64
End: 2024-01-18
Payer: COMMERCIAL

## 2024-01-18 DIAGNOSIS — M25.511 ACUTE PAIN OF RIGHT SHOULDER: Primary | ICD-10-CM

## 2024-01-18 DIAGNOSIS — S43.51XA SPRAIN OF RIGHT ACROMIOCLAVICULAR JOINT, INITIAL ENCOUNTER: ICD-10-CM

## 2024-01-18 DIAGNOSIS — M75.121 COMPLETE TEAR OF RIGHT ROTATOR CUFF, UNSPECIFIED WHETHER TRAUMATIC: ICD-10-CM

## 2024-01-18 DIAGNOSIS — M75.21 BICIPITAL TENDINITIS, RIGHT: ICD-10-CM

## 2024-01-18 PROCEDURE — 97140 MANUAL THERAPY 1/> REGIONS: CPT

## 2024-01-18 PROCEDURE — 97112 NEUROMUSCULAR REEDUCATION: CPT

## 2024-01-18 PROCEDURE — 97110 THERAPEUTIC EXERCISES: CPT

## 2024-01-18 NOTE — PLAN OF CARE
[facility]  ED Nurse Handoff Report    ED Chief complaint: Altered Mental Status      ED Diagnosis:   Final diagnoses:   Unresponsive       Code Status: Full Code    Allergies: No Known Allergies    Patient Story: Patient was at work when she was found down and unresponsive.  Continued to be unresponsive until over an hour in to her ED course.  Began to become more responsive.  Patient is now talking on her phone.  She is being worked potentially for a first time seizure.  She has no history of this.    Focused Assessment:  Labs and imagining unremarkable    Treatments and/or interventions provided: Keppra, Ativan x1  Patient's response to treatments and/or interventions: patient alert and oreinted at this time    To be done/followed up on inpatient unit:      Does this patient have any cognitive concerns?: cont to monitor mental status    Activity level - Baseline/Home:  Independent  Activity Level - Current:   Unknown    Patient's Preferred language: Macanese   Needed?: No Patient able to communicate in english  Isolation: None  Infection: Not Applicable  Bariatric?: No    Vital Signs:   Vitals:    01/31/20 1613 01/31/20 1615 01/31/20 1630 01/31/20 1645   BP: 110/78 (!) 106/97 130/80 (!) 135/91   Pulse:  84 84 77   Resp: 16 25  15   Temp: 97.6  F (36.4  C)      TempSrc: Temporal      SpO2: 100% 100%  100%   Weight: 90 kg (198 lb 6.6 oz)          Cardiac Rhythm:Cardiac Rhythm: Normal sinus rhythm    Was the PSS-3 completed:   Yes  What interventions are required if any?               Family Comments:   OBS brochure/video discussed/provided to patient/family: N/A              Name of person given brochure if not patient:               Relationship to patient:     For the majority of the shift this patient's behavior was Green.   Behavioral interventions performed were .    ED NURSE PHONE NUMBER: *[phone]          [facility]  Physical Therapy Updated Plan of Care    Name: Lisa Roberson  Clinic Number: [id number]    Therapy Diagnosis:   Encounter Diagnoses   Name Primary?    Acute pain of right shoulder Yes    Sprain of right acromioclavicular joint, initial encounter     Bicipital tendinitis, right     Complete tear of right rotator cuff, unspecified whether traumatic      Physician: Hemant Arrieta MD    Visit Date: 1/18/2024    Physician Orders: PT Eval and Treat  Medical Diagnosis from Referral: pain in right shoulder, sprain of right acromioclavicular joint, bicipital tendinitis, and complete tear of right rotator cuff  Evaluation Date: 12/5/2023  Date of Surgery: 11/20/2023  Authorization Period Expiration: 11/29/2024  Plan of Care Expiration: 2/16/2024  Visit # / Visits authorized: 12/21 (including initial evaluation)  PTA Visit #: 0    Time In: 1020  Time Out: 1108  Total Billable Time: 48 minutes    Precautions: Standard    Next MD visit: as needed     Subjective     Pt reports: The cold weather outside seems to be making her right shoulder hurt worse, but overall, she is doing well. Patient reports she would like a few more exercises to be added to her home exercise program over the next few visits.     She was compliant with home exercise program.    Pain: 3/10  Location: right shoulder      Objective     Bettie received therapeutic exercises to develop strength, endurance, ROM, flexibility, and posture for 16 minutes including:    UBE: x 5 minutes (backwards)  Pulleys: x 5 minutes  Supine cane flexion: with 3 lb cane x 20 reps with 5 second holds   Wall cane flexion: x 15 reps    Bettie received the following manual therapy techniques: were applied to the: right shoulder for 8 minutes, including:    Passive range of motion of the right shoulder into flexion, abduction, external rotation, and internal rotation    Bettie participated in neuromuscular re-education activities to  improve: Posture for 24 minutes. The following activities were included:    Right scapular proprioceptive neuromuscular facilitation in left sidelying: x 15 reps protraction/retraction and x 15 reps elevation/depression  Sidelying right shoulder external rotation with scapular retraction: 2 lbs; 2 x 10 reps  Standing rows: red band: x 20 reps  Standing scapular retractions with upper extremity extension: red theraband; x 20 reps  Standing bilateral shoulder external rotation: red band; 2 x 10 reps    Supine scapular retraction: x 20 reps with 3 second holds (not performed)   Sitting scapular retractions: x 30 reps with 3 second holds (not performed)    Bettie received the following supervised modalities after being cleared for contradictions: premodulated IFC Electrical Stimulation for pain control applied to the right shoulder. Pt received stimulation at and intensity of 13 V and a frequency of  Hz for 0 minutes. Lisa tolderated treatment well without any adverse effects.      Bettie received cold pack for 0 minutes to right shoulder in conjunction with premodulated IFC electrical stimulation.      Right Shoulder AROM   Flexion 137 degrees   Abduction 130 degrees   External Rotation at 0 Degrees Abduction 68 degrees   Functional Internal Rotation sacrum     Home Exercises Provided and Patient Education Provided     Education provided: Patient educated on the importance of completing skilled physical therapy treatment and home exercise program as prescribed to maximize functional gains.    Written Home Exercises Provided: yes. Exercises were reviewed and Bettie was able to demonstrate them prior to the end of the session.  Bettie demonstrated good  understanding of the education provided.     See EMR under Patient Instructions for exercises provided during therapy sessions.    Assessment     Patient with good effort throughout treatment. Patient with improved right shoulder flexion active range of motion this  treatment had initially planned to only attend one more physical therapy treatment session; however, after much discussion, patient agreeable to extend plan of care 2x/week x 4 weeks to continue to progress right shoulder range of motion/strength. Physical Therapist will continue to progress therapeutic exercise, neuromuscular re-education, and therapeutic activities as able with manual therapy and modalities utilized as needed.     Bettie isprogressing well towards her goals.   Pt prognosis is Good.     Pt will continue to benefit from skilled outpatient physical therapy to address the deficits listed in the problem list box on initial evaluation, provide pt/family education, and to maximize pt's level of independence in the home and community environment.     Pt's spiritual, cultural, and educational needs considered and pt agreeable to plan of care and goals.     Anticipated barriers to physical therapy: compliance with home exercise program; natural course of healing     Goals:    Short Term Goals:  Patient will demonstrate independence with home exercise program to ensure carryover of treatment. [Met: will continue to progress as able]  Patient will demonstrate 90 degrees of right shoulder flexion/abduction active range of motion to improve functional use of the right upper extremity. [Met]  Patient will demonstrate 35 degrees of right shoulder external rotation at 0 degrees abduction to improve functional use of the right upper extremity. [Met]  Patient will demonstrate functional right shoulder internal rotation to L4 to improve functional use of the right upper extremity. [Met]  Patient will improve right upper extremity strength to 3+/5 to improve functional use of the right upper extremity. [Not met]     Long Term Goals:  Patient will demonstrate 140 degrees of right shoulder flexion/abduction active range of motion to improve functional use of the right upper extremity. [Not met: 137 degrees]  Patient will  demonstrate 70 degrees of right shoulder external rotation at 0 degrees abduction to improve functional use of the right upper extremity. [Met]  Patient will demonstrate functional right shoulder internal rotation to T8 to improve functional use of the right upper extremity. [Not met]  Patient will improve right upper extremity strength to 4+/5 to improve functional use of the right upper extremity. [Not met]  Patient will demonstrate the ability to place a 5lb weight on/off an overhead shelf with the right upper extremity with proper scapulohumeral rhythm to improve functional use of the right upper extremity. [Not met]    Reasons for Recertification of Therapy: Patient has not met all goals.     Plan     Updated Certification Period: 1/18/2024 to 2/16/2024  Recommended Treatment Plan: 2 times per week for 4 weeks: Electrical Stimulation (IFC/premodualted IFC), Gait Training, Manual Therapy, Neuromuscular Re-ed, Patient Education, Therapeutic Activities, and Therapeutic Exercise  Other Recommendations: N/A      Trev Hutton, PT, DPT  1/18/2024      I CERTIFY THE NEED FOR THESE SERVICES FURNISHED UNDER THIS PLAN OF TREATMENT AND WHILE UNDER MY CARE.    Physician's comments:      Physician's Signature: ___________________________________________________  Date: ___________________________

## 2024-01-23 ENCOUNTER — CLINICAL SUPPORT (OUTPATIENT)
Dept: REHABILITATION | Facility: HOSPITAL | Age: 64
End: 2024-01-23
Payer: COMMERCIAL

## 2024-01-23 DIAGNOSIS — S43.51XA SPRAIN OF RIGHT ACROMIOCLAVICULAR JOINT, INITIAL ENCOUNTER: ICD-10-CM

## 2024-01-23 DIAGNOSIS — M25.511 ACUTE PAIN OF RIGHT SHOULDER: ICD-10-CM

## 2024-01-23 DIAGNOSIS — M75.121 NONTRAUMATIC COMPLETE TEAR OF RIGHT ROTATOR CUFF: Primary | ICD-10-CM

## 2024-01-23 DIAGNOSIS — M75.21 BICIPITAL TENDINITIS, RIGHT: ICD-10-CM

## 2024-01-23 PROCEDURE — 97014 ELECTRIC STIMULATION THERAPY: CPT

## 2024-01-23 PROCEDURE — 97140 MANUAL THERAPY 1/> REGIONS: CPT

## 2024-01-23 PROCEDURE — 97112 NEUROMUSCULAR REEDUCATION: CPT

## 2024-01-23 PROCEDURE — 97010 HOT OR COLD PACKS THERAPY: CPT

## 2024-01-23 PROCEDURE — 97110 THERAPEUTIC EXERCISES: CPT

## 2024-01-23 NOTE — PLAN OF CARE
[facility]  Physical Therapy Updated Plan of Care    Name: Lisa Roberson  Clinic Number: [id number]    Therapy Diagnosis:   Encounter Diagnoses   Name Primary?    Acute pain of right shoulder Yes    Sprain of right acromioclavicular joint, initial encounter     Bicipital tendinitis, right     Complete tear of right rotator cuff, unspecified whether traumatic      Physician: Hemant Arrieta MD    Visit Date: 1/18/2024    Physician Orders: PT Eval and Treat  Medical Diagnosis from Referral: pain in right shoulder, sprain of right acromioclavicular joint, bicipital tendinitis, and complete tear of right rotator cuff  Evaluation Date: 12/5/2023  Date of Surgery: 11/20/2023  Authorization Period Expiration: 11/29/2024  Plan of Care Expiration: 2/16/2024  Visit # / Visits authorized: 12/21 (including initial evaluation)  PTA Visit #: 0    Time In: 1020  Time Out: 1108  Total Billable Time: 48 minutes    Precautions: Standard    Next MD visit: as needed     Subjective     Pt reports: The cold weather outside seems to be making her right shoulder hurt worse, but overall, she is doing well. Patient reports she would like a few more exercises to be added to her home exercise program over the next few visits.     She was compliant with home exercise program.    Pain: 3/10  Location: right shoulder      Objective     Bettie received therapeutic exercises to develop strength, endurance, ROM, flexibility, and posture for 16 minutes including:    UBE: x 5 minutes (backwards)  Pulleys: x 5 minutes  Supine cane flexion: with 3 lb cane x 20 reps with 5 second holds   Wall cane flexion: x 15 reps    Bettie received the following manual therapy techniques: were applied to the: right shoulder for 8 minutes, including:    Passive range of motion of the right shoulder into flexion, abduction, external rotation, and internal rotation    Bettie participated in neuromuscular re-education activities to  improve: Posture for 24 minutes. The following activities were included:    Right scapular proprioceptive neuromuscular facilitation in left sidelying: x 15 reps protraction/retraction and x 15 reps elevation/depression  Sidelying right shoulder external rotation with scapular retraction: 2 lbs; 2 x 10 reps  Standing rows: red band: x 20 reps  Standing scapular retractions with upper extremity extension: red theraband; x 20 reps  Standing bilateral shoulder external rotation: red band; 2 x 10 reps    Supine scapular retraction: x 20 reps with 3 second holds (not performed)   Sitting scapular retractions: x 30 reps with 3 second holds (not performed)    Bettie received the following supervised modalities after being cleared for contradictions: premodulated IFC Electrical Stimulation for pain control applied to the right shoulder. Pt received stimulation at and intensity of 13 V and a frequency of  Hz for 0 minutes. Lisa tolderated treatment well without any adverse effects.      Bettie received cold pack for 0 minutes to right shoulder in conjunction with premodulated IFC electrical stimulation.      Right Shoulder AROM   Flexion 137 degrees   Abduction 130 degrees   External Rotation at 0 Degrees Abduction 68 degrees   Functional Internal Rotation sacrum     Home Exercises Provided and Patient Education Provided     Education provided: Patient educated on the importance of completing skilled physical therapy treatment and home exercise program as prescribed to maximize functional gains.    Written Home Exercises Provided: yes. Exercises were reviewed and Bettie was able to demonstrate them prior to the end of the session.  Bettie demonstrated good  understanding of the education provided.     See EMR under Patient Instructions for exercises provided during therapy sessions.    Assessment     Patient with good effort throughout treatment. Patient with improved right shoulder flexion active range of motion this  treatment had initially planned to only attend one more physical therapy treatment session; however, after much discussion, patient agreeable to extend plan of care 2x/week x 4 weeks to continue to progress right shoulder range of motion/strength. Physical Therapist will continue to progress therapeutic exercise, neuromuscular re-education, and therapeutic activities as able with manual therapy and modalities utilized as needed.     Bettie isprogressing well towards her goals.   Pt prognosis is Good.     Pt will continue to benefit from skilled outpatient physical therapy to address the deficits listed in the problem list box on initial evaluation, provide pt/family education, and to maximize pt's level of independence in the home and community environment.     Pt's spiritual, cultural, and educational needs considered and pt agreeable to plan of care and goals.     Anticipated barriers to physical therapy: compliance with home exercise program; natural course of healing     Goals:    Short Term Goals:  Patient will demonstrate independence with home exercise program to ensure carryover of treatment. [Met: will continue to progress as able]  Patient will demonstrate 90 degrees of right shoulder flexion/abduction active range of motion to improve functional use of the right upper extremity. [Met]  Patient will demonstrate 35 degrees of right shoulder external rotation at 0 degrees abduction to improve functional use of the right upper extremity. [Met]  Patient will demonstrate functional right shoulder internal rotation to L4 to improve functional use of the right upper extremity. [Met]  Patient will improve right upper extremity strength to 3+/5 to improve functional use of the right upper extremity. [Not met]     Long Term Goals:  Patient will demonstrate 140 degrees of right shoulder flexion/abduction active range of motion to improve functional use of the right upper extremity. [Not met: 137 degrees]  Patient will  demonstrate 70 degrees of right shoulder external rotation at 0 degrees abduction to improve functional use of the right upper extremity. [Met]  Patient will demonstrate functional right shoulder internal rotation to T8 to improve functional use of the right upper extremity. [Not met]  Patient will improve right upper extremity strength to 4+/5 to improve functional use of the right upper extremity. [Not met]  Patient will demonstrate the ability to place a 5lb weight on/off an overhead shelf with the right upper extremity with proper scapulohumeral rhythm to improve functional use of the right upper extremity. [Not met]    Reasons for Recertification of Therapy: Patient has not met all goals.     Plan     Updated Certification Period: 1/23/2024 to 2/16/2024  Recommended Treatment Plan: 2 times per week for 4 weeks: Electrical Stimulation (IFC/premodualted IFC), Gait Training, Manual Therapy, Neuromuscular Re-ed, Patient Education, Therapeutic Activities, and Therapeutic Exercise  Other Recommendations: N/A      Trev Hutton, PT, DPT  1/23/2024      I CERTIFY THE NEED FOR THESE SERVICES FURNISHED UNDER THIS PLAN OF TREATMENT AND WHILE UNDER MY CARE.    Physician's comments:      Physician's Signature: ___________________________________________________  Date: ___________________________

## 2024-01-23 NOTE — PROGRESS NOTES
[facility]  Physical Therapy Updated Plan of Care    Name: Lisa Roberson  Clinic Number: [id number]    Therapy Diagnosis:   Encounter Diagnoses   Name Primary?    Acute pain of right shoulder Yes    Sprain of right acromioclavicular joint, initial encounter     Bicipital tendinitis, right     Complete tear of right rotator cuff, unspecified whether traumatic      Physician: Hemant Arrieta MD    Visit Date: 1/18/2024    Physician Orders: PT Eval and Treat  Medical Diagnosis from Referral: pain in right shoulder, sprain of right acromioclavicular joint, bicipital tendinitis, and complete tear of right rotator cuff  Evaluation Date: 12/5/2023  Date of Surgery: 11/20/2023  Authorization Period Expiration: 11/29/2024  Plan of Care Expiration: 2/16/2024  Visit # / Visits authorized: 12/21 (including initial evaluation)  PTA Visit #: 0    Time In: 0933  Time Out: 1030  Total Billable Time: 57 minutes    Precautions: Standard    Next MD visit: as needed     Subjective     Pt reports: Her right shoulder is hurting worse today; however, patient thinks it is due to leaning on her right upper extremity while relaxing in the evenings. Patient reports she has made adjustments to her sitting/resting posture to see if this improves her pain.     She was compliant with home exercise program.    Pain: 5/10  Location: right shoulder      Objective     Bettie received therapeutic exercises to develop strength, endurance, ROM, flexibility, and posture for 16 minutes including:    UBE: x 5 minutes (backwards)  Pulleys: x 5 minutes  Supine cane flexion: 3 lbs; x 20 reps with 5 second holds  Wall cane flexion: x 20 reps    Bettie received the following manual therapy techniques: were applied to the: right shoulder for 8 minutes, including:    Passive range of motion of the right shoulder into flexion, abduction, external rotation, and internal rotation    Bettie participated in neuromuscular  re-education activities to improve: Posture for 18 minutes. The following activities were included:    Right scapular proprioceptive neuromuscular facilitation in left sidelying: x 15 reps protraction/retraction and x 15 reps elevation/depression  Supine right shoulder rhythmic stabilizations (random) at 90 degrees flexion  Supine right shoulder flexion active range of motion guided by Physical Therapist to ensure proper form: x 15 reps   Standing rows: red theraband; x 20 reps  Standing scapular retractions with upper extremity extension: red theraband; x 20 reps  Standing bilateral shoulder external rotation: red theraband; 2 x 10 reps    Sidelying right shoulder external rotation with scapular retraction: 2 lbs; 2 x 10 reps (not performed)   Supine scapular retraction: x 20 reps with 3 second holds (not performed)   Sitting scapular retractions: x 30 reps with 3 second holds (not performed)    Bettie received the following supervised modalities after being cleared for contradictions: premodulated IFC Electrical Stimulation for pain control applied to the right shoulder. Pt received stimulation at and intensity of 12.5 V and a frequency of  Hz for 15 minutes. Lisa tolderated treatment well without any adverse effects.      Bettie received cold pack for 15 minutes to right shoulder in conjunction with premodulated IFC electrical stimulation.      Right Shoulder AROM   Flexion 137 degrees   Abduction 130 degrees   External Rotation at 0 Degrees Abduction 68 degrees   Functional Internal Rotation sacrum     Home Exercises Provided and Patient Education Provided     Education provided: Patient educated on the importance of completing skilled physical therapy treatment and home exercise program as prescribed to maximize functional gains.    Written Home Exercises Provided: yes. Exercises were reviewed and Bettie was able to demonstrate them prior to the end of the session.  Bettie demonstrated good  understanding  of the education provided.     See EMR under Patient Instructions for exercises provided during therapy sessions.    Assessment     Patient with good effort throughout treatment. Patient with slightly reduced right shoulder flexion and abduction active range of motion this treatment; however, patient's pain was increased this treatment compared to previous sessions. Patient with good tolerance of new neuromuscular re-education. Physical Therapist will continue to progress therapeutic exercise, neuromuscular re-education, and therapeutic activities as able with manual therapy and modalities utilized as needed.     Bettie isprogressing well towards her goals.   Pt prognosis is Good.     Pt will continue to benefit from skilled outpatient physical therapy to address the deficits listed in the problem list box on initial evaluation, provide pt/family education, and to maximize pt's level of independence in the home and community environment.     Pt's spiritual, cultural, and educational needs considered and pt agreeable to plan of care and goals.     Anticipated barriers to physical therapy: compliance with home exercise program; natural course of healing     Goals:    Short Term Goals:  Patient will demonstrate independence with home exercise program to ensure carryover of treatment. [Met: will continue to progress as able]  Patient will demonstrate 90 degrees of right shoulder flexion/abduction active range of motion to improve functional use of the right upper extremity. [Met]  Patient will demonstrate 35 degrees of right shoulder external rotation at 0 degrees abduction to improve functional use of the right upper extremity. [Met]  Patient will demonstrate functional right shoulder internal rotation to L4 to improve functional use of the right upper extremity. [Met]  Patient will improve right upper extremity strength to 3+/5 to improve functional use of the right upper extremity. [Not met]     Long Term  Goals:  Patient will demonstrate 140 degrees of right shoulder flexion/abduction active range of motion to improve functional use of the right upper extremity. [Not met: 137 degrees]  Patient will demonstrate 70 degrees of right shoulder external rotation at 0 degrees abduction to improve functional use of the right upper extremity. [Met]  Patient will demonstrate functional right shoulder internal rotation to T8 to improve functional use of the right upper extremity. [Not met]  Patient will improve right upper extremity strength to 4+/5 to improve functional use of the right upper extremity. [Not met]  Patient will demonstrate the ability to place a 5lb weight on/off an overhead shelf with the right upper extremity with proper scapulohumeral rhythm to improve functional use of the right upper extremity. [Not met]    Plan     Patient will continue to benefit from skilled physical therapy treatment as prescribed working towards goals listed above to maximize functional potential.       Trev Hutton, PT, DPT  1/23/2024

## 2024-01-26 ENCOUNTER — CLINICAL SUPPORT (OUTPATIENT)
Dept: REHABILITATION | Facility: HOSPITAL | Age: 64
End: 2024-01-26
Payer: COMMERCIAL

## 2024-01-26 DIAGNOSIS — M75.21 BICIPITAL TENDINITIS, RIGHT: ICD-10-CM

## 2024-01-26 DIAGNOSIS — S43.51XA SPRAIN OF RIGHT ACROMIOCLAVICULAR JOINT, INITIAL ENCOUNTER: ICD-10-CM

## 2024-01-26 DIAGNOSIS — M25.511 ACUTE PAIN OF RIGHT SHOULDER: Primary | ICD-10-CM

## 2024-01-26 DIAGNOSIS — M75.121 COMPLETE TEAR OF RIGHT ROTATOR CUFF, UNSPECIFIED WHETHER TRAUMATIC: ICD-10-CM

## 2024-01-26 PROCEDURE — 97110 THERAPEUTIC EXERCISES: CPT | Mod: CQ

## 2024-01-26 PROCEDURE — 97112 NEUROMUSCULAR REEDUCATION: CPT | Mod: CQ

## 2024-01-26 PROCEDURE — 97032 APPL MODALITY 1+ESTIM EA 15: CPT | Mod: CQ

## 2024-01-26 NOTE — PROGRESS NOTES
[facility]  Physical Therapy Updated Plan of Care    Name: Lisa Roberson  Clinic Number: [id number]    Therapy Diagnosis:   Encounter Diagnoses   Name Primary?    Acute pain of right shoulder Yes    Sprain of right acromioclavicular joint, initial encounter     Bicipital tendinitis, right     Complete tear of right rotator cuff, unspecified whether traumatic      Physician: Hemant Arrieta MD    Visit Date: 1/18/2024    Physician Orders: PT Eval and Treat  Medical Diagnosis from Referral: pain in right shoulder, sprain of right acromioclavicular joint, bicipital tendinitis, and complete tear of right rotator cuff  Evaluation Date: 12/5/2023  Date of Surgery: 11/20/2023  Authorization Period Expiration: 11/29/2024  Plan of Care Expiration: 2/16/2024  Visit # / Visits authorized: 14/21 (including initial evaluation)  PTA Visit #: 1    Time In: 1018  Time Out: 1107  Total Billable Time: 49 minutes    Precautions: Standard    Next MD visit: as needed     Subjective     Pt reports: Her right shoulder is feeling better today with 2/10 pain.     She was compliant with home exercise program.    Pain: 2/10  Location: right shoulder      Objective     Bettie received therapeutic exercises to develop strength, endurance, ROM, flexibility, and posture for 15 minutes including:  UBE: x 5 minutes (backwards)  Pulleys: x 5 minutes  Supine cane flexion: 3 lbs; x 20 reps with 5 second holds  Wall cane flexion: x 20 reps    Bettie received the following manual therapy techniques: were applied to the: right shoulder for 0 minutes, including:  Passive range of motion of the right shoulder into flexion, abduction, external rotation, and internal rotation    Bettie participated in neuromuscular re-education activities to improve: Posture for 19 minutes. The following activities were included:  Supine scapular retraction: x 20 reps with 3 second holds   Right scapular proprioceptive neuromuscular  facilitation in left sidelying: x 15 reps protraction/retraction and x 15 reps elevation/depression  Sidelying right shoulder external rotation with scapular retraction: 2 lbs; 2 x 10 reps   Supine right shoulder rhythmic stabilizations (random) at 90 degrees flexion  Supine right shoulder flexion active range of motion guided by Physical Therapist to ensure proper form: x 15 reps   Standing rows: red theraband; x 20 reps  Standing scapular retractions with upper extremity extension: red theraband; x 20 reps  Standing bilateral shoulder external rotation: red theraband; 2 x 10 reps    Bettie received the following supervised modalities after being cleared for contradictions: premodulated IFC Electrical Stimulation for pain control applied to the right shoulder. Pt received stimulation at and intensity of 12.5 V and a frequency of  Hz for 15 minutes. Lisa tolderated treatment well without any adverse effects.      Bettie received cold pack for 15 minutes to right shoulder in conjunction with premodulated IFC electrical stimulation.      Right Shoulder AROM   Flexion 137 degrees   Abduction 130 degrees   External Rotation at 0 Degrees Abduction 68 degrees   Functional Internal Rotation sacrum     Home Exercises Provided and Patient Education Provided     Education provided: Patient educated on the importance of completing skilled physical therapy treatment and home exercise program as prescribed to maximize functional gains.    Written Home Exercises Provided: yes. Exercises were reviewed and Bettie was able to demonstrate them prior to the end of the session.  Bettie demonstrated good  understanding of the education provided.     See EMR under Patient Instructions for exercises provided during therapy sessions.    Assessment     Patient with good effort throughout treatment. Patient did receive electrical stimulation in conjunction with ice pack for pain control due to slight pain in her shoulder following  therapy. Physical Therapist Assistant will continue to progress therapeutic exercise, neuromuscular re-education, and therapeutic activities as able with manual therapy and modalities utilized as needed.     Bettie isprogressing well towards her goals.   Pt prognosis is Good.     Pt will continue to benefit from skilled outpatient physical therapy to address the deficits listed in the problem list box on initial evaluation, provide pt/family education, and to maximize pt's level of independence in the home and community environment.     Pt's spiritual, cultural, and educational needs considered and pt agreeable to plan of care and goals.     Anticipated barriers to physical therapy: compliance with home exercise program; natural course of healing     Goals:    Short Term Goals:  Patient will demonstrate independence with home exercise program to ensure carryover of treatment. [Met: will continue to progress as able]  Patient will demonstrate 90 degrees of right shoulder flexion/abduction active range of motion to improve functional use of the right upper extremity. [Met]  Patient will demonstrate 35 degrees of right shoulder external rotation at 0 degrees abduction to improve functional use of the right upper extremity. [Met]  Patient will demonstrate functional right shoulder internal rotation to L4 to improve functional use of the right upper extremity. [Met]  Patient will improve right upper extremity strength to 3+/5 to improve functional use of the right upper extremity. [Not met]     Long Term Goals:  Patient will demonstrate 140 degrees of right shoulder flexion/abduction active range of motion to improve functional use of the right upper extremity. [Not met: 137 degrees]  Patient will demonstrate 70 degrees of right shoulder external rotation at 0 degrees abduction to improve functional use of the right upper extremity. [Met]  Patient will demonstrate functional right shoulder internal rotation to T8 to  improve functional use of the right upper extremity. [Not met]  Patient will improve right upper extremity strength to 4+/5 to improve functional use of the right upper extremity. [Not met]  Patient will demonstrate the ability to place a 5lb weight on/off an overhead shelf with the right upper extremity with proper scapulohumeral rhythm to improve functional use of the right upper extremity. [Not met]    Plan     Patient will continue to benefit from skilled physical therapy treatment as prescribed working towards goals listed above to maximize functional potential.       RHODA Vargas  1/26/2024

## 2024-04-01 ENCOUNTER — HOSPITAL ENCOUNTER (OUTPATIENT)
Dept: RADIOLOGY | Facility: HOSPITAL | Age: 64
Discharge: HOME | End: 2024-04-01
Attending: NURSE PRACTITIONER
Payer: COMMERCIAL

## 2024-04-01 DIAGNOSIS — S49.91XA RIGHT SHOULDER INJURY: Primary | ICD-10-CM

## 2024-04-01 DIAGNOSIS — S49.91XA RIGHT SHOULDER INJURY: ICD-10-CM

## 2024-04-01 PROCEDURE — 73030 X-RAY EXAM OF SHOULDER: CPT | Mod: TC,RT

## 2024-04-12 ENCOUNTER — DOCUMENTATION ONLY (OUTPATIENT)
Dept: REHABILITATION | Facility: HOSPITAL | Age: 64
End: 2024-04-12
Payer: COMMERCIAL

## 2024-04-12 PROBLEM — M75.121 COMPLETE ROTATOR CUFF TEAR OR RUPTURE OF RIGHT SHOULDER, NOT SPECIFIED AS TRAUMATIC: Status: RESOLVED | Noted: 2023-12-05 | Resolved: 2024-04-12

## 2024-04-12 PROBLEM — M75.21 BICIPITAL TENDINITIS, RIGHT: Status: RESOLVED | Noted: 2023-12-05 | Resolved: 2024-04-12

## 2024-04-12 PROBLEM — S43.51XA SPRAIN OF RIGHT ACROMIOCLAVICULAR JOINT: Status: RESOLVED | Noted: 2023-12-05 | Resolved: 2024-04-12

## 2024-04-12 PROBLEM — M25.511 RIGHT SHOULDER PAIN: Status: RESOLVED | Noted: 2023-12-05 | Resolved: 2024-04-12

## 2024-04-12 NOTE — PROGRESS NOTES
[facility]  Physical Therapy Discharge Summary    Name: Lisa Roberson  Clinic Number: [id number]    Therapy Diagnosis:        Encounter Diagnoses   Name Primary?    Acute pain of right shoulder Yes    Sprain of right acromioclavicular joint, initial encounter      Bicipital tendinitis, right      Complete tear of right rotator cuff, unspecified whether traumatic        Physician: Hemant Arrieta MD       Physician Orders: PT Eval and Treat  Medical Diagnosis from Referral: pain in right shoulder, sprain of right acromioclavicular joint, bicipital tendinitis, and complete tear of right rotator cuff  Evaluation Date: 12/5/2023    Date of Last visit: 1/18/2024  Total Visits Received: 14    Assessment        Right Shoulder AROM   Flexion 137 degrees   Abduction 130 degrees   External Rotation at 0 Degrees Abduction 68 degrees   Functional Internal Rotation sacrum      Goals:    Short Term Goals:  Patient will demonstrate independence with home exercise program to ensure carryover of treatment. [Met: will continue to progress as able]  Patient will demonstrate 90 degrees of right shoulder flexion/abduction active range of motion to improve functional use of the right upper extremity. [Met]  Patient will demonstrate 35 degrees of right shoulder external rotation at 0 degrees abduction to improve functional use of the right upper extremity. [Met]  Patient will demonstrate functional right shoulder internal rotation to L4 to improve functional use of the right upper extremity. [Met]  Patient will improve right upper extremity strength to 3+/5 to improve functional use of the right upper extremity. [Met within available range of motion]     Long Term Goals:  Patient will demonstrate 140 degrees of right shoulder flexion/abduction active range of motion to improve functional use of the right upper extremity. [Not met: 137 degrees flexion; 130 degrees abduction]  Patient will  demonstrate 70 degrees of right shoulder external rotation at 0 degrees abduction to improve functional use of the right upper extremity. [Not met: 68 degrees]  Patient will demonstrate functional right shoulder internal rotation to T8 to improve functional use of the right upper extremity. [Not met: sacrum]  Patient will improve right upper extremity strength to 4+/5 to improve functional use of the right upper extremity. [Not met]  Patient will demonstrate the ability to place a 5lb weight on/off an overhead shelf with the right upper extremity with proper scapulohumeral rhythm to improve functional use of the right upper extremity. [Not met]    Discharge reason: Patient has not attended therapy since 1/18/2024.    Plan     This patient is discharged from Physical Therapy.      Trev Hutton, PT, DPT  4/12/2024

## 2024-11-15 ENCOUNTER — HOSPITAL ENCOUNTER (OUTPATIENT)
Dept: RADIOLOGY | Facility: HOSPITAL | Age: 64
Discharge: HOME | End: 2024-11-15
Attending: NURSE PRACTITIONER
Payer: MEDICARE

## 2024-11-15 DIAGNOSIS — R06.2 WHEEZING: Primary | ICD-10-CM

## 2024-11-15 DIAGNOSIS — R06.2 WHEEZING: ICD-10-CM

## 2024-11-15 PROCEDURE — 71046 X-RAY EXAM CHEST 2 VIEWS: CPT | Mod: 26,,, | Performed by: RADIOLOGY

## 2024-11-15 PROCEDURE — 71046 X-RAY EXAM CHEST 2 VIEWS: CPT | Mod: TC

## 2025-03-17 ENCOUNTER — HOSPITAL ENCOUNTER (OUTPATIENT)
Dept: RADIOLOGY | Facility: HOSPITAL | Age: 65
Discharge: HOME | End: 2025-03-17
Attending: NURSE PRACTITIONER
Payer: MEDICARE

## 2025-03-17 DIAGNOSIS — R06.2 WHEEZING: ICD-10-CM

## 2025-03-17 DIAGNOSIS — R06.2 WHEEZING: Primary | ICD-10-CM

## 2025-03-17 PROCEDURE — 71046 X-RAY EXAM CHEST 2 VIEWS: CPT | Mod: 26,,, | Performed by: RADIOLOGY

## 2025-03-17 PROCEDURE — 71046 X-RAY EXAM CHEST 2 VIEWS: CPT | Mod: TC
